# Patient Record
Sex: FEMALE | Race: WHITE | NOT HISPANIC OR LATINO | URBAN - METROPOLITAN AREA
[De-identification: names, ages, dates, MRNs, and addresses within clinical notes are randomized per-mention and may not be internally consistent; named-entity substitution may affect disease eponyms.]

---

## 2017-05-22 PROBLEM — E78.00 PURE HYPERCHOLESTEROLEMIA: Status: ACTIVE | Noted: 2017-05-22

## 2018-02-12 PROBLEM — Z80.3 FAMILY HISTORY OF BREAST CANCER: Status: ACTIVE | Noted: 2018-02-12

## 2018-05-01 PROBLEM — M54.16 LUMBAR RADICULOPATHY: Status: ACTIVE | Noted: 2018-05-01

## 2019-02-22 PROCEDURE — 87624 HPV HI-RISK TYP POOLED RSLT: CPT | Performed by: FAMILY MEDICINE

## 2019-02-22 PROCEDURE — 88175 CYTOPATH C/V AUTO FLUID REDO: CPT | Performed by: FAMILY MEDICINE

## 2019-07-16 PROCEDURE — 88305 TISSUE EXAM BY PATHOLOGIST: CPT | Performed by: SPECIALIST

## 2021-02-11 ENCOUNTER — IMMUNIZATION (OUTPATIENT)
Dept: LAB | Age: 61
End: 2021-02-11

## 2021-02-11 DIAGNOSIS — Z23 NEED FOR VACCINATION: Primary | ICD-10-CM

## 2021-02-11 PROCEDURE — 0001A COVID 19 PFIZER-BIONTECH: CPT

## 2021-02-11 PROCEDURE — 91300 COVID 19 PFIZER-BIONTECH: CPT

## 2021-03-05 ENCOUNTER — IMMUNIZATION (OUTPATIENT)
Dept: LAB | Age: 61
End: 2021-03-05
Attending: HOSPITALIST

## 2021-03-05 DIAGNOSIS — Z23 NEED FOR VACCINATION: Primary | ICD-10-CM

## 2021-03-05 PROCEDURE — 0002A COVID 19 PFIZER-BIONTECH: CPT

## 2021-03-05 PROCEDURE — 91300 COVID 19 PFIZER-BIONTECH: CPT

## 2022-08-16 RX ORDER — ABALOPARATIDE 2000 UG/ML
3120 INJECTION, SOLUTION SUBCUTANEOUS DAILY
COMMUNITY

## 2022-08-16 RX ORDER — CEPHALEXIN 500 MG/1
500 CAPSULE ORAL 2 TIMES DAILY
COMMUNITY
End: 2022-08-23

## 2022-08-16 RX ORDER — PANTOPRAZOLE SODIUM 40 MG/1
40 TABLET, DELAYED RELEASE ORAL
COMMUNITY

## 2022-08-17 ENCOUNTER — LAB ENCOUNTER (OUTPATIENT)
Dept: LAB | Age: 62
End: 2022-08-17
Attending: ORTHOPAEDIC SURGERY
Payer: COMMERCIAL

## 2022-08-17 DIAGNOSIS — Z01.818 PREOP TESTING: ICD-10-CM

## 2022-08-17 DIAGNOSIS — Z01.818 PRE-OP TESTING: ICD-10-CM

## 2022-08-17 DIAGNOSIS — Z72.0 TOBACCO USE: ICD-10-CM

## 2022-08-17 DIAGNOSIS — E78.00 PURE HYPERCHOLESTEROLEMIA: ICD-10-CM

## 2022-08-17 LAB
ALBUMIN SERPL-MCNC: 4 G/DL (ref 3.4–5)
ALBUMIN/GLOB SERPL: 1.1 {RATIO} (ref 1–2)
ALP LIVER SERPL-CCNC: 107 U/L
ALT SERPL-CCNC: 27 U/L
ANION GAP SERPL CALC-SCNC: 8 MMOL/L (ref 0–18)
ANTIBODY SCREEN: NEGATIVE
APTT PPP: 29.2 SECONDS (ref 23.3–35.6)
AST SERPL-CCNC: 20 U/L (ref 15–37)
BASOPHILS # BLD AUTO: 0.05 X10(3) UL (ref 0–0.2)
BASOPHILS NFR BLD AUTO: 0.8 %
BILIRUB SERPL-MCNC: 0.3 MG/DL (ref 0.1–2)
BUN BLD-MCNC: 8 MG/DL (ref 7–18)
BUN/CREAT SERPL: 11.1 (ref 10–20)
CALCIUM BLD-MCNC: 9.7 MG/DL (ref 8.5–10.1)
CHLORIDE SERPL-SCNC: 98 MMOL/L (ref 98–112)
CHOLEST SERPL-MCNC: 186 MG/DL (ref ?–200)
CO2 SERPL-SCNC: 25 MMOL/L (ref 21–32)
CREAT BLD-MCNC: 0.72 MG/DL
DEPRECATED RDW RBC AUTO: 45 FL (ref 35.1–46.3)
EOSINOPHIL # BLD AUTO: 0.14 X10(3) UL (ref 0–0.7)
EOSINOPHIL NFR BLD AUTO: 2.1 %
ERYTHROCYTE [DISTWIDTH] IN BLOOD BY AUTOMATED COUNT: 13 % (ref 11–15)
FASTING PATIENT LIPID ANSWER: NO
FASTING STATUS PATIENT QL REPORTED: NO
GFR SERPLBLD BASED ON 1.73 SQ M-ARVRAT: 96 ML/MIN/1.73M2 (ref 60–?)
GLOBULIN PLAS-MCNC: 3.6 G/DL (ref 2.8–4.4)
GLUCOSE BLD-MCNC: 95 MG/DL (ref 70–99)
HCT VFR BLD AUTO: 32.6 %
HDLC SERPL-MCNC: 97 MG/DL (ref 40–59)
HGB BLD-MCNC: 11.1 G/DL
IMM GRANULOCYTES # BLD AUTO: 0.02 X10(3) UL (ref 0–1)
IMM GRANULOCYTES NFR BLD: 0.3 %
INR BLD: 1.03 (ref 0.85–1.16)
LDLC SERPL CALC-MCNC: 80 MG/DL (ref ?–100)
LYMPHOCYTES # BLD AUTO: 1.27 X10(3) UL (ref 1–4)
LYMPHOCYTES NFR BLD AUTO: 19.3 %
MCH RBC QN AUTO: 32 PG (ref 26–34)
MCHC RBC AUTO-ENTMCNC: 34 G/DL (ref 31–37)
MCV RBC AUTO: 93.9 FL
MONOCYTES # BLD AUTO: 0.53 X10(3) UL (ref 0.1–1)
MONOCYTES NFR BLD AUTO: 8.1 %
MRSA DNA SPEC QL NAA+PROBE: NEGATIVE
NEUTROPHILS # BLD AUTO: 4.56 X10 (3) UL (ref 1.5–7.7)
NEUTROPHILS # BLD AUTO: 4.56 X10(3) UL (ref 1.5–7.7)
NEUTROPHILS NFR BLD AUTO: 69.4 %
NONHDLC SERPL-MCNC: 89 MG/DL (ref ?–130)
OSMOLALITY SERPL CALC.SUM OF ELEC: 270 MOSM/KG (ref 275–295)
PLATELET # BLD AUTO: 347 10(3)UL (ref 150–450)
POTASSIUM SERPL-SCNC: 4.3 MMOL/L (ref 3.5–5.1)
PROT SERPL-MCNC: 7.6 G/DL (ref 6.4–8.2)
PROTHROMBIN TIME: 13.4 SECONDS (ref 11.6–14.8)
RBC # BLD AUTO: 3.47 X10(6)UL
RH BLOOD TYPE: POSITIVE
RH BLOOD TYPE: POSITIVE
SARS-COV-2 RNA RESP QL NAA+PROBE: NOT DETECTED
SODIUM SERPL-SCNC: 131 MMOL/L (ref 136–145)
TRIGL SERPL-MCNC: 45 MG/DL (ref 30–149)
TSI SER-ACNC: 1.33 MIU/ML (ref 0.36–3.74)
VLDLC SERPL CALC-MCNC: 7 MG/DL (ref 0–30)
WBC # BLD AUTO: 6.6 X10(3) UL (ref 4–11)

## 2022-08-17 PROCEDURE — 80053 COMPREHEN METABOLIC PANEL: CPT

## 2022-08-17 PROCEDURE — 85610 PROTHROMBIN TIME: CPT

## 2022-08-17 PROCEDURE — 86920 COMPATIBILITY TEST SPIN: CPT

## 2022-08-17 PROCEDURE — 86850 RBC ANTIBODY SCREEN: CPT

## 2022-08-17 PROCEDURE — 84443 ASSAY THYROID STIM HORMONE: CPT

## 2022-08-17 PROCEDURE — 85730 THROMBOPLASTIN TIME PARTIAL: CPT

## 2022-08-17 PROCEDURE — 86901 BLOOD TYPING SEROLOGIC RH(D): CPT

## 2022-08-17 PROCEDURE — 80061 LIPID PANEL: CPT

## 2022-08-17 PROCEDURE — 86900 BLOOD TYPING SEROLOGIC ABO: CPT

## 2022-08-17 PROCEDURE — 36415 COLL VENOUS BLD VENIPUNCTURE: CPT

## 2022-08-17 PROCEDURE — 87641 MR-STAPH DNA AMP PROBE: CPT

## 2022-08-17 PROCEDURE — 85025 COMPLETE CBC W/AUTO DIFF WBC: CPT

## 2022-08-18 NOTE — PAT NURSING NOTE
Spoke with RN staff at Dr. Max Juan office regarding patient low sodium of 131. Patent with known Hyponatremia per internal medicine MD note. Message will be sent to Dr. Maira Campbell. [id number] Mullins from MD office called and requested CBC and CMP be faxed to office for MD review. Faxed to [phone].

## 2022-08-19 ENCOUNTER — APPOINTMENT (OUTPATIENT)
Dept: GENERAL RADIOLOGY | Facility: HOSPITAL | Age: 62
End: 2022-08-19
Attending: ORTHOPAEDIC SURGERY
Payer: COMMERCIAL

## 2022-08-19 ENCOUNTER — ANESTHESIA EVENT (OUTPATIENT)
Dept: SURGERY | Facility: HOSPITAL | Age: 62
End: 2022-08-19
Payer: COMMERCIAL

## 2022-08-19 ENCOUNTER — ANESTHESIA (OUTPATIENT)
Dept: SURGERY | Facility: HOSPITAL | Age: 62
End: 2022-08-19
Payer: COMMERCIAL

## 2022-08-19 ENCOUNTER — HOSPITAL ENCOUNTER (INPATIENT)
Facility: HOSPITAL | Age: 62
LOS: 4 days | Discharge: HOME HEALTH | End: 2022-08-23
Attending: ORTHOPAEDIC SURGERY | Admitting: ORTHOPAEDIC SURGERY
Payer: COMMERCIAL

## 2022-08-19 DIAGNOSIS — M41.25 OTHER IDIOPATHIC SCOLIOSIS, THORACOLUMBAR REGION: ICD-10-CM

## 2022-08-19 DIAGNOSIS — M54.16 LUMBAR RADICULOPATHY: ICD-10-CM

## 2022-08-19 DIAGNOSIS — Z01.818 PREOP TESTING: Primary | ICD-10-CM

## 2022-08-19 LAB
ANION GAP SERPL CALC-SCNC: 9 MMOL/L (ref 0–18)
BASE EXCESS BLD CALC-SCNC: -6.3 MMOL/L (ref ?–2)
BASE EXCESS BLD CALC-SCNC: -8.1 MMOL/L (ref ?–2)
BUN BLD-MCNC: 14 MG/DL (ref 7–18)
BUN/CREAT SERPL: 17.7 (ref 10–20)
CA-I BLD-SCNC: 1.15 MMOL/L (ref 0.95–1.32)
CA-I BLD-SCNC: 1.16 MMOL/L (ref 0.95–1.32)
CALCIUM BLD-MCNC: 9.7 MG/DL (ref 8.5–10.1)
CHLORIDE SERPL-SCNC: 104 MMOL/L (ref 98–112)
CO2 SERPL-SCNC: 22 MMOL/L (ref 21–32)
COHGB MFR BLD: 2.1 % (ref 0–3)
COHGB MFR BLD: 2.4 % (ref 0–3)
CREAT BLD-MCNC: 0.79 MG/DL
GFR SERPLBLD BASED ON 1.73 SQ M-ARVRAT: 86 ML/MIN/1.73M2 (ref 60–?)
GLUCOSE BLD-MCNC: 102 MG/DL (ref 70–99)
HCO3 BLDA-SCNC: 18.6 MEQ/L (ref 21–27)
HCO3 BLDA-SCNC: 20 MEQ/L (ref 21–27)
HGB BLD-MCNC: 7.6 G/DL
HGB BLD-MCNC: 8.4 G/DL
LACTATE BLD-SCNC: 0.6 MMOL/L (ref 0.5–2)
LACTATE BLD-SCNC: 0.7 MMOL/L (ref 0.5–2)
METHGB MFR BLD: 1.1 % SAT (ref 0.4–1.5)
METHGB MFR BLD: 1.2 % SAT (ref 0.4–1.5)
O2 CT BLD-SCNC: 11 VOL% (ref 15–23)
O2 CT BLD-SCNC: 12.1 VOL% (ref 15–23)
OSMOLALITY SERPL CALC.SUM OF ELEC: 281 MOSM/KG (ref 275–295)
PCO2 BLDA: 35 MM HG (ref 35–45)
PCO2 BLDA: 37 MM HG (ref 35–45)
PH BLDA: 7.29 [PH] (ref 7.35–7.45)
PH BLDA: 7.34 [PH] (ref 7.35–7.45)
PO2 BLDA: 244 MM HG (ref 80–100)
PO2 BLDA: 253 MM HG (ref 80–100)
POTASSIUM BLD-SCNC: 3.6 MMOL/L (ref 3.6–5.1)
POTASSIUM BLD-SCNC: 3.8 MMOL/L (ref 3.6–5.1)
POTASSIUM SERPL-SCNC: 4 MMOL/L (ref 3.5–5.1)
PUNCTURE CHARGE: NO
PUNCTURE CHARGE: NO
SAO2 % BLDA: >99 % (ref 94–100)
SAO2 % BLDA: >99 % (ref 94–100)
SODIUM BLD-SCNC: 132 MMOL/L (ref 135–145)
SODIUM BLD-SCNC: 133 MMOL/L (ref 135–145)
SODIUM SERPL-SCNC: 135 MMOL/L (ref 136–145)

## 2022-08-19 PROCEDURE — 84295 ASSAY OF SERUM SODIUM: CPT | Performed by: ANESTHESIOLOGY

## 2022-08-19 PROCEDURE — 95939 C MOTOR EVOKED UPR&LWR LIMBS: CPT | Performed by: ORTHOPAEDIC SURGERY

## 2022-08-19 PROCEDURE — 83605 ASSAY OF LACTIC ACID: CPT | Performed by: ANESTHESIOLOGY

## 2022-08-19 PROCEDURE — 82330 ASSAY OF CALCIUM: CPT | Performed by: ANESTHESIOLOGY

## 2022-08-19 PROCEDURE — 36430 TRANSFUSION BLD/BLD COMPNT: CPT | Performed by: ANESTHESIOLOGY

## 2022-08-19 PROCEDURE — 0SG30AJ FUSION OF LUMBOSACRAL JOINT WITH INTERBODY FUSION DEVICE, POSTERIOR APPROACH, ANTERIOR COLUMN, OPEN APPROACH: ICD-10-PCS | Performed by: ORTHOPAEDIC SURGERY

## 2022-08-19 PROCEDURE — 0ST20ZZ RESECTION OF LUMBAR VERTEBRAL DISC, OPEN APPROACH: ICD-10-PCS | Performed by: ORTHOPAEDIC SURGERY

## 2022-08-19 PROCEDURE — 0SG704Z FUSION OF RIGHT SACROILIAC JOINT WITH INTERNAL FIXATION DEVICE, OPEN APPROACH: ICD-10-PCS | Performed by: ORTHOPAEDIC SURGERY

## 2022-08-19 PROCEDURE — P9045 ALBUMIN (HUMAN), 5%, 250 ML: HCPCS | Performed by: ANESTHESIOLOGY

## 2022-08-19 PROCEDURE — 3E0U0GB INTRODUCTION OF RECOMBINANT BONE MORPHOGENETIC PROTEIN INTO JOINTS, OPEN APPROACH: ICD-10-PCS | Performed by: ORTHOPAEDIC SURGERY

## 2022-08-19 PROCEDURE — 80048 BASIC METABOLIC PNL TOTAL CA: CPT | Performed by: ORTHOPAEDIC SURGERY

## 2022-08-19 PROCEDURE — 84132 ASSAY OF SERUM POTASSIUM: CPT | Performed by: ANESTHESIOLOGY

## 2022-08-19 PROCEDURE — 01NB0ZZ RELEASE LUMBAR NERVE, OPEN APPROACH: ICD-10-PCS | Performed by: ORTHOPAEDIC SURGERY

## 2022-08-19 PROCEDURE — 82805 BLOOD GASES W/O2 SATURATION: CPT | Performed by: ANESTHESIOLOGY

## 2022-08-19 PROCEDURE — 0SG00AJ FUSION OF LUMBAR VERTEBRAL JOINT WITH INTERBODY FUSION DEVICE, POSTERIOR APPROACH, ANTERIOR COLUMN, OPEN APPROACH: ICD-10-PCS | Performed by: ORTHOPAEDIC SURGERY

## 2022-08-19 PROCEDURE — P9045 ALBUMIN (HUMAN), 5%, 250 ML: HCPCS

## 2022-08-19 PROCEDURE — 4A11X4G MONITORING OF PERIPHERAL NERVOUS ELECTRICAL ACTIVITY, INTRAOPERATIVE, EXTERNAL APPROACH: ICD-10-PCS | Performed by: ORTHOPAEDIC SURGERY

## 2022-08-19 PROCEDURE — 83050 HGB METHEMOGLOBIN QUAN: CPT | Performed by: ANESTHESIOLOGY

## 2022-08-19 PROCEDURE — 0SG1071 FUSION OF 2 OR MORE LUMBAR VERTEBRAL JOINTS WITH AUTOLOGOUS TISSUE SUBSTITUTE, POSTERIOR APPROACH, POSTERIOR COLUMN, OPEN APPROACH: ICD-10-PCS | Performed by: ORTHOPAEDIC SURGERY

## 2022-08-19 PROCEDURE — 82375 ASSAY CARBOXYHB QUANT: CPT | Performed by: ANESTHESIOLOGY

## 2022-08-19 PROCEDURE — 95860 NEEDLE EMG 1 EXTREMITY: CPT | Performed by: ORTHOPAEDIC SURGERY

## 2022-08-19 PROCEDURE — 76000 FLUOROSCOPY <1 HR PHYS/QHP: CPT | Performed by: ORTHOPAEDIC SURGERY

## 2022-08-19 PROCEDURE — 0ST40ZZ RESECTION OF LUMBOSACRAL DISC, OPEN APPROACH: ICD-10-PCS | Performed by: ORTHOPAEDIC SURGERY

## 2022-08-19 PROCEDURE — 0SG804Z FUSION OF LEFT SACROILIAC JOINT WITH INTERNAL FIXATION DEVICE, OPEN APPROACH: ICD-10-PCS | Performed by: ORTHOPAEDIC SURGERY

## 2022-08-19 PROCEDURE — 30233N1 TRANSFUSION OF NONAUTOLOGOUS RED BLOOD CELLS INTO PERIPHERAL VEIN, PERCUTANEOUS APPROACH: ICD-10-PCS | Performed by: ORTHOPAEDIC SURGERY

## 2022-08-19 PROCEDURE — 95938 SOMATOSENSORY TESTING: CPT | Performed by: ORTHOPAEDIC SURGERY

## 2022-08-19 PROCEDURE — 85018 HEMOGLOBIN: CPT | Performed by: ANESTHESIOLOGY

## 2022-08-19 PROCEDURE — 0SG00A0 FUSION OF LUMBAR VERTEBRAL JOINT WITH INTERBODY FUSION DEVICE, ANTERIOR APPROACH, ANTERIOR COLUMN, OPEN APPROACH: ICD-10-PCS | Performed by: ORTHOPAEDIC SURGERY

## 2022-08-19 PROCEDURE — 0SG3071 FUSION OF LUMBOSACRAL JOINT WITH AUTOLOGOUS TISSUE SUBSTITUTE, POSTERIOR APPROACH, POSTERIOR COLUMN, OPEN APPROACH: ICD-10-PCS | Performed by: ORTHOPAEDIC SURGERY

## 2022-08-19 PROCEDURE — 0RG7071 FUSION OF 2 TO 7 THORACIC VERTEBRAL JOINTS WITH AUTOLOGOUS TISSUE SUBSTITUTE, POSTERIOR APPROACH, POSTERIOR COLUMN, OPEN APPROACH: ICD-10-PCS | Performed by: ORTHOPAEDIC SURGERY

## 2022-08-19 DEVICE — BONE GRAFT KIT 7510600 INFUSE LARGE
Type: IMPLANTABLE DEVICE | Site: BACK | Status: FUNCTIONAL
Brand: INFUSE® BONE GRAFT

## 2022-08-19 DEVICE — OPEN-OPEN REVISION CONNECTOR, 11 MM
Type: IMPLANTABLE DEVICE | Site: BACK | Status: FUNCTIONAL
Brand: INVICTUS

## 2022-08-19 DEVICE — DURASEAL® DURAL SEALANT SYSTEM 5ML 5 PACK
Type: IMPLANTABLE DEVICE | Site: BACK | Status: FUNCTIONAL
Brand: DURASEAL®

## 2022-08-19 DEVICE — BONE ALLO CANCELOUS CHIP 30CC: Type: IMPLANTABLE DEVICE | Site: BACK | Status: FUNCTIONAL

## 2022-08-19 DEVICE — GRAFT BONE PROPEL MEDIUM FIBER: Type: IMPLANTABLE DEVICE | Site: BACK | Status: FUNCTIONAL

## 2022-08-19 DEVICE — SET SCREW
Type: IMPLANTABLE DEVICE | Site: BACK | Status: FUNCTIONAL
Brand: INVICTUS

## 2022-08-19 RX ORDER — HYDROMORPHONE HYDROCHLORIDE 1 MG/ML
0.4 INJECTION, SOLUTION INTRAMUSCULAR; INTRAVENOUS; SUBCUTANEOUS EVERY 2 HOUR PRN
Status: DISCONTINUED | OUTPATIENT
Start: 2022-08-19 | End: 2022-08-23

## 2022-08-19 RX ORDER — SODIUM CHLORIDE, SODIUM LACTATE, POTASSIUM CHLORIDE, CALCIUM CHLORIDE 600; 310; 30; 20 MG/100ML; MG/100ML; MG/100ML; MG/100ML
INJECTION, SOLUTION INTRAVENOUS CONTINUOUS
Status: DISCONTINUED | OUTPATIENT
Start: 2022-08-19 | End: 2022-08-23

## 2022-08-19 RX ORDER — CEFAZOLIN SODIUM/WATER 2 G/20 ML
2 SYRINGE (ML) INTRAVENOUS ONCE
Status: COMPLETED | OUTPATIENT
Start: 2022-08-19 | End: 2022-08-19

## 2022-08-19 RX ORDER — HYDROMORPHONE HYDROCHLORIDE 1 MG/ML
0.2 INJECTION, SOLUTION INTRAMUSCULAR; INTRAVENOUS; SUBCUTANEOUS EVERY 5 MIN PRN
Status: DISCONTINUED | OUTPATIENT
Start: 2022-08-19 | End: 2022-08-19 | Stop reason: HOSPADM

## 2022-08-19 RX ORDER — MORPHINE SULFATE 4 MG/ML
2 INJECTION, SOLUTION INTRAMUSCULAR; INTRAVENOUS EVERY 10 MIN PRN
Status: DISCONTINUED | OUTPATIENT
Start: 2022-08-19 | End: 2022-08-19 | Stop reason: HOSPADM

## 2022-08-19 RX ORDER — OXYCODONE HYDROCHLORIDE 5 MG/1
5 TABLET ORAL EVERY 4 HOURS PRN
Status: DISCONTINUED | OUTPATIENT
Start: 2022-08-19 | End: 2022-08-23

## 2022-08-19 RX ORDER — DIPHENHYDRAMINE HCL 25 MG
25 CAPSULE ORAL EVERY 4 HOURS PRN
Status: DISCONTINUED | OUTPATIENT
Start: 2022-08-19 | End: 2022-08-23

## 2022-08-19 RX ORDER — PROCHLORPERAZINE EDISYLATE 5 MG/ML
5 INJECTION INTRAMUSCULAR; INTRAVENOUS EVERY 8 HOURS PRN
Status: DISCONTINUED | OUTPATIENT
Start: 2022-08-19 | End: 2022-08-23

## 2022-08-19 RX ORDER — MAGNESIUM HYDROXIDE 1200 MG/15ML
LIQUID ORAL CONTINUOUS PRN
Status: COMPLETED | OUTPATIENT
Start: 2022-08-19 | End: 2022-08-19

## 2022-08-19 RX ORDER — GLYCOPYRROLATE 0.2 MG/ML
INJECTION, SOLUTION INTRAMUSCULAR; INTRAVENOUS AS NEEDED
Status: DISCONTINUED | OUTPATIENT
Start: 2022-08-19 | End: 2022-08-19 | Stop reason: SURG

## 2022-08-19 RX ORDER — DEXAMETHASONE SODIUM PHOSPHATE 4 MG/ML
VIAL (ML) INJECTION AS NEEDED
Status: DISCONTINUED | OUTPATIENT
Start: 2022-08-19 | End: 2022-08-19 | Stop reason: SURG

## 2022-08-19 RX ORDER — MORPHINE SULFATE 4 MG/ML
4 INJECTION, SOLUTION INTRAMUSCULAR; INTRAVENOUS EVERY 10 MIN PRN
Status: DISCONTINUED | OUTPATIENT
Start: 2022-08-19 | End: 2022-08-19 | Stop reason: HOSPADM

## 2022-08-19 RX ORDER — SODIUM CHLORIDE 9 MG/ML
INJECTION, SOLUTION INTRAVENOUS CONTINUOUS
Status: DISCONTINUED | OUTPATIENT
Start: 2022-08-19 | End: 2022-08-23

## 2022-08-19 RX ORDER — CEFAZOLIN SODIUM/WATER 2 G/20 ML
2 SYRINGE (ML) INTRAVENOUS EVERY 8 HOURS
Status: COMPLETED | OUTPATIENT
Start: 2022-08-19 | End: 2022-08-20

## 2022-08-19 RX ORDER — HYDROMORPHONE HYDROCHLORIDE 1 MG/ML
0.4 INJECTION, SOLUTION INTRAMUSCULAR; INTRAVENOUS; SUBCUTANEOUS EVERY 5 MIN PRN
Status: DISCONTINUED | OUTPATIENT
Start: 2022-08-19 | End: 2022-08-19 | Stop reason: HOSPADM

## 2022-08-19 RX ORDER — MORPHINE SULFATE 10 MG/ML
6 INJECTION, SOLUTION INTRAMUSCULAR; INTRAVENOUS EVERY 10 MIN PRN
Status: DISCONTINUED | OUTPATIENT
Start: 2022-08-19 | End: 2022-08-19 | Stop reason: HOSPADM

## 2022-08-19 RX ORDER — PREGABALIN 50 MG/1
50 CAPSULE ORAL 2 TIMES DAILY
Status: DISCONTINUED | OUTPATIENT
Start: 2022-08-19 | End: 2022-08-23

## 2022-08-19 RX ORDER — NALOXONE HYDROCHLORIDE 0.4 MG/ML
80 INJECTION, SOLUTION INTRAMUSCULAR; INTRAVENOUS; SUBCUTANEOUS AS NEEDED
Status: DISCONTINUED | OUTPATIENT
Start: 2022-08-19 | End: 2022-08-19 | Stop reason: HOSPADM

## 2022-08-19 RX ORDER — CYCLOBENZAPRINE HCL 5 MG
5 TABLET ORAL EVERY 6 HOURS PRN
Status: DISCONTINUED | OUTPATIENT
Start: 2022-08-19 | End: 2022-08-23

## 2022-08-19 RX ORDER — ACETAMINOPHEN 500 MG
1000 TABLET ORAL ONCE
Status: COMPLETED | OUTPATIENT
Start: 2022-08-19 | End: 2022-08-19

## 2022-08-19 RX ORDER — EPHEDRINE SULFATE 50 MG/ML
INJECTION, SOLUTION INTRAVENOUS AS NEEDED
Status: DISCONTINUED | OUTPATIENT
Start: 2022-08-19 | End: 2022-08-19 | Stop reason: SURG

## 2022-08-19 RX ORDER — ONDANSETRON 2 MG/ML
4 INJECTION INTRAMUSCULAR; INTRAVENOUS EVERY 6 HOURS PRN
Status: DISCONTINUED | OUTPATIENT
Start: 2022-08-19 | End: 2022-08-23

## 2022-08-19 RX ORDER — TRANEXAMIC ACID 100 MG/ML
INJECTION, SOLUTION INTRAVENOUS AS NEEDED
Status: DISCONTINUED | OUTPATIENT
Start: 2022-08-19 | End: 2022-08-19 | Stop reason: SURG

## 2022-08-19 RX ORDER — DOCUSATE SODIUM 100 MG/1
100 CAPSULE, LIQUID FILLED ORAL 2 TIMES DAILY
Status: DISCONTINUED | OUTPATIENT
Start: 2022-08-19 | End: 2022-08-23

## 2022-08-19 RX ORDER — NEOSTIGMINE METHYLSULFATE 1 MG/ML
INJECTION, SOLUTION INTRAVENOUS AS NEEDED
Status: DISCONTINUED | OUTPATIENT
Start: 2022-08-19 | End: 2022-08-19 | Stop reason: SURG

## 2022-08-19 RX ORDER — VANCOMYCIN HYDROCHLORIDE 1 G/20ML
INJECTION, POWDER, LYOPHILIZED, FOR SOLUTION INTRAVENOUS AS NEEDED
Status: DISCONTINUED | OUTPATIENT
Start: 2022-08-19 | End: 2022-08-19 | Stop reason: HOSPADM

## 2022-08-19 RX ORDER — ONDANSETRON 2 MG/ML
4 INJECTION INTRAMUSCULAR; INTRAVENOUS EVERY 6 HOURS PRN
Status: DISCONTINUED | OUTPATIENT
Start: 2022-08-19 | End: 2022-08-19 | Stop reason: HOSPADM

## 2022-08-19 RX ORDER — DIPHENHYDRAMINE HYDROCHLORIDE 50 MG/ML
25 INJECTION INTRAMUSCULAR; INTRAVENOUS EVERY 4 HOURS PRN
Status: DISCONTINUED | OUTPATIENT
Start: 2022-08-19 | End: 2022-08-23

## 2022-08-19 RX ORDER — BISACODYL 10 MG
10 SUPPOSITORY, RECTAL RECTAL
Status: DISCONTINUED | OUTPATIENT
Start: 2022-08-19 | End: 2022-08-23

## 2022-08-19 RX ORDER — SODIUM CHLORIDE, SODIUM LACTATE, POTASSIUM CHLORIDE, CALCIUM CHLORIDE 600; 310; 30; 20 MG/100ML; MG/100ML; MG/100ML; MG/100ML
INJECTION, SOLUTION INTRAVENOUS CONTINUOUS
Status: DISCONTINUED | OUTPATIENT
Start: 2022-08-19 | End: 2022-08-19 | Stop reason: HOSPADM

## 2022-08-19 RX ORDER — OXYCODONE HYDROCHLORIDE 5 MG/1
10 TABLET ORAL EVERY 4 HOURS PRN
Status: DISCONTINUED | OUTPATIENT
Start: 2022-08-19 | End: 2022-08-23

## 2022-08-19 RX ORDER — SENNOSIDES 8.6 MG
17.2 TABLET ORAL NIGHTLY
Status: DISCONTINUED | OUTPATIENT
Start: 2022-08-19 | End: 2022-08-23

## 2022-08-19 RX ORDER — SODIUM CHLORIDE 9 MG/ML
INJECTION, SOLUTION INTRAVENOUS CONTINUOUS PRN
Status: DISCONTINUED | OUTPATIENT
Start: 2022-08-19 | End: 2022-08-19 | Stop reason: SURG

## 2022-08-19 RX ORDER — ROCURONIUM BROMIDE 10 MG/ML
INJECTION, SOLUTION INTRAVENOUS AS NEEDED
Status: DISCONTINUED | OUTPATIENT
Start: 2022-08-19 | End: 2022-08-19 | Stop reason: SURG

## 2022-08-19 RX ORDER — HYDROMORPHONE HYDROCHLORIDE 1 MG/ML
0.6 INJECTION, SOLUTION INTRAMUSCULAR; INTRAVENOUS; SUBCUTANEOUS EVERY 5 MIN PRN
Status: DISCONTINUED | OUTPATIENT
Start: 2022-08-19 | End: 2022-08-19 | Stop reason: HOSPADM

## 2022-08-19 RX ORDER — ONDANSETRON 2 MG/ML
INJECTION INTRAMUSCULAR; INTRAVENOUS AS NEEDED
Status: DISCONTINUED | OUTPATIENT
Start: 2022-08-19 | End: 2022-08-19 | Stop reason: SURG

## 2022-08-19 RX ORDER — POLYETHYLENE GLYCOL 3350 17 G/17G
17 POWDER, FOR SOLUTION ORAL DAILY PRN
Status: DISCONTINUED | OUTPATIENT
Start: 2022-08-19 | End: 2022-08-23

## 2022-08-19 RX ORDER — SODIUM PHOSPHATE, DIBASIC AND SODIUM PHOSPHATE, MONOBASIC 7; 19 G/133ML; G/133ML
1 ENEMA RECTAL ONCE AS NEEDED
Status: DISCONTINUED | OUTPATIENT
Start: 2022-08-19 | End: 2022-08-23

## 2022-08-19 RX ORDER — ALBUMIN, HUMAN INJ 5% 5 %
SOLUTION INTRAVENOUS CONTINUOUS PRN
Status: DISCONTINUED | OUTPATIENT
Start: 2022-08-19 | End: 2022-08-19 | Stop reason: SURG

## 2022-08-19 RX ORDER — HYDROMORPHONE HYDROCHLORIDE 1 MG/ML
0.8 INJECTION, SOLUTION INTRAMUSCULAR; INTRAVENOUS; SUBCUTANEOUS EVERY 2 HOUR PRN
Status: DISCONTINUED | OUTPATIENT
Start: 2022-08-19 | End: 2022-08-23

## 2022-08-19 RX ORDER — PROCHLORPERAZINE EDISYLATE 5 MG/ML
5 INJECTION INTRAMUSCULAR; INTRAVENOUS EVERY 8 HOURS PRN
Status: DISCONTINUED | OUTPATIENT
Start: 2022-08-19 | End: 2022-08-19 | Stop reason: HOSPADM

## 2022-08-19 RX ADMIN — ROCURONIUM BROMIDE 10 MG: 10 INJECTION, SOLUTION INTRAVENOUS at 08:34:00

## 2022-08-19 RX ADMIN — SODIUM CHLORIDE: 9 INJECTION, SOLUTION INTRAVENOUS at 09:28:00

## 2022-08-19 RX ADMIN — NEOSTIGMINE METHYLSULFATE 4 MG: 1 INJECTION, SOLUTION INTRAVENOUS at 14:34:00

## 2022-08-19 RX ADMIN — EPHEDRINE SULFATE 5 MG: 50 INJECTION, SOLUTION INTRAVENOUS at 07:55:00

## 2022-08-19 RX ADMIN — ROCURONIUM BROMIDE 25 MG: 10 INJECTION, SOLUTION INTRAVENOUS at 08:20:00

## 2022-08-19 RX ADMIN — EPHEDRINE SULFATE 5 MG: 50 INJECTION, SOLUTION INTRAVENOUS at 10:47:00

## 2022-08-19 RX ADMIN — SODIUM CHLORIDE: 9 INJECTION, SOLUTION INTRAVENOUS at 08:40:00

## 2022-08-19 RX ADMIN — SODIUM CHLORIDE, SODIUM LACTATE, POTASSIUM CHLORIDE, CALCIUM CHLORIDE: 600; 310; 30; 20 INJECTION, SOLUTION INTRAVENOUS at 11:07:00

## 2022-08-19 RX ADMIN — SODIUM CHLORIDE, SODIUM LACTATE, POTASSIUM CHLORIDE, CALCIUM CHLORIDE: 600; 310; 30; 20 INJECTION, SOLUTION INTRAVENOUS at 10:08:00

## 2022-08-19 RX ADMIN — EPHEDRINE SULFATE 5 MG: 50 INJECTION, SOLUTION INTRAVENOUS at 11:13:00

## 2022-08-19 RX ADMIN — SODIUM CHLORIDE, SODIUM LACTATE, POTASSIUM CHLORIDE, CALCIUM CHLORIDE: 600; 310; 30; 20 INJECTION, SOLUTION INTRAVENOUS at 08:37:00

## 2022-08-19 RX ADMIN — EPHEDRINE SULFATE 5 MG: 50 INJECTION, SOLUTION INTRAVENOUS at 13:09:00

## 2022-08-19 RX ADMIN — Medication 50 ML: at 12:59:00

## 2022-08-19 RX ADMIN — SODIUM CHLORIDE: 9 INJECTION, SOLUTION INTRAVENOUS at 12:20:00

## 2022-08-19 RX ADMIN — SODIUM CHLORIDE, SODIUM LACTATE, POTASSIUM CHLORIDE, CALCIUM CHLORIDE: 600; 310; 30; 20 INJECTION, SOLUTION INTRAVENOUS at 07:28:00

## 2022-08-19 RX ADMIN — EPHEDRINE SULFATE 5 MG: 50 INJECTION, SOLUTION INTRAVENOUS at 12:39:00

## 2022-08-19 RX ADMIN — CEFAZOLIN SODIUM/WATER 2 G: 2 G/20 ML SYRINGE (ML) INTRAVENOUS at 11:48:00

## 2022-08-19 RX ADMIN — SODIUM CHLORIDE: 9 INJECTION, SOLUTION INTRAVENOUS at 11:13:00

## 2022-08-19 RX ADMIN — SODIUM CHLORIDE, SODIUM LACTATE, POTASSIUM CHLORIDE, CALCIUM CHLORIDE: 600; 310; 30; 20 INJECTION, SOLUTION INTRAVENOUS at 13:55:00

## 2022-08-19 RX ADMIN — CEFAZOLIN SODIUM/WATER 2 G: 2 G/20 ML SYRINGE (ML) INTRAVENOUS at 07:48:00

## 2022-08-19 RX ADMIN — EPHEDRINE SULFATE 5 MG: 50 INJECTION, SOLUTION INTRAVENOUS at 13:02:00

## 2022-08-19 RX ADMIN — ROCURONIUM BROMIDE 5 MG: 10 INJECTION, SOLUTION INTRAVENOUS at 07:37:00

## 2022-08-19 RX ADMIN — EPHEDRINE SULFATE 5 MG: 50 INJECTION, SOLUTION INTRAVENOUS at 10:19:00

## 2022-08-19 RX ADMIN — SODIUM CHLORIDE: 9 INJECTION, SOLUTION INTRAVENOUS at 07:50:00

## 2022-08-19 RX ADMIN — ALBUMIN, HUMAN INJ 5%: 5 SOLUTION INTRAVENOUS at 10:46:00

## 2022-08-19 RX ADMIN — SODIUM CHLORIDE: 9 INJECTION, SOLUTION INTRAVENOUS at 10:10:00

## 2022-08-19 RX ADMIN — SODIUM CHLORIDE: 9 INJECTION, SOLUTION INTRAVENOUS at 11:14:00

## 2022-08-19 RX ADMIN — SODIUM CHLORIDE: 9 INJECTION, SOLUTION INTRAVENOUS at 09:27:00

## 2022-08-19 RX ADMIN — SODIUM CHLORIDE: 9 INJECTION, SOLUTION INTRAVENOUS at 13:55:00

## 2022-08-19 RX ADMIN — ONDANSETRON 4 MG: 2 INJECTION INTRAMUSCULAR; INTRAVENOUS at 14:03:00

## 2022-08-19 RX ADMIN — TRANEXAMIC ACID 3185 MG: 100 INJECTION, SOLUTION INTRAVENOUS at 07:45:00

## 2022-08-19 RX ADMIN — SODIUM CHLORIDE: 9 INJECTION, SOLUTION INTRAVENOUS at 14:49:00

## 2022-08-19 RX ADMIN — EPHEDRINE SULFATE 5 MG: 50 INJECTION, SOLUTION INTRAVENOUS at 09:15:00

## 2022-08-19 RX ADMIN — ALBUMIN, HUMAN INJ 5%: 5 SOLUTION INTRAVENOUS at 09:50:00

## 2022-08-19 RX ADMIN — SODIUM CHLORIDE: 9 INJECTION, SOLUTION INTRAVENOUS at 11:46:00

## 2022-08-19 RX ADMIN — SODIUM CHLORIDE: 9 INJECTION, SOLUTION INTRAVENOUS at 13:04:00

## 2022-08-19 RX ADMIN — DEXAMETHASONE SODIUM PHOSPHATE 8 MG: 4 MG/ML VIAL (ML) INJECTION at 08:42:00

## 2022-08-19 RX ADMIN — EPHEDRINE SULFATE 5 MG: 50 INJECTION, SOLUTION INTRAVENOUS at 07:50:00

## 2022-08-19 RX ADMIN — GLYCOPYRROLATE 0.8 MG: 0.2 INJECTION, SOLUTION INTRAMUSCULAR; INTRAVENOUS at 14:34:00

## 2022-08-19 NOTE — BRIEF OP NOTE
Pre-Operative Diagnosis: Other idiopathic scoliosis, thoracolumbar region [M41.25]  Lumbar radiculopathy [M54.16]     Post-Operative Diagnosis: Other idiopathic scoliosis, thoracolumbar region [M41.25]Lumbar radiculopathy [M54.16]      Procedure Performed:   PSF and instrumentation  T10-Pelvis, Right lateral interbody fusion L1-2, Right TLIF L4-5 and Left TLIF L5-S1, finney radha osteotomy L4-5 and L5-S1, facetectomy's T10-T4    Surgeon(s) and Role:     * Kaye Mills MD - Primary     * Rio Coleman MD - Assisting Surgeon    Assistant(s):  Surgical Assistant.: Janelle Cuellar     Surgical Findings: Degenerative scoliosis with foraminal stenosis      Specimen: NA    Deep drain placed      Estimated Blood Loss: Blood Output: 900 mL (8/19/2022  2:08 PM)    Received 2 units PRBC transfusion       Dictation Number:      Cornelio Duke MD  8/19/2022  2:45 PM

## 2022-08-19 NOTE — ANESTHESIA PROCEDURE NOTES
Airway  Date/Time: 8/19/2022 7:41 AM  Urgency: Elective      General Information and Staff    Patient location during procedure: OR  Anesthesiologist: Keshawn Soriano DO  Performed: anesthesiologist     Indications and Patient Condition  Indications for airway management: anesthesia  Sedation level: deep  Preoxygenated: yes  Patient position: sniffing  Mask difficulty assessment: 1 - vent by mask    Final Airway Details  Final airway type: endotracheal airway      Successful airway: ETT  Cuffed: yes   Successful intubation technique: direct laryngoscopy  Endotracheal tube insertion site: oral  Blade: Cristhian  Blade size: #3  ETT size (mm): 7.5    Cormack-Lehane Classification: grade I - full view of glottis  Placement verified by: chest auscultation and capnometry   Measured from: lips  ETT to lips (cm): 21  Number of attempts at approach: 1  Number of other approaches attempted: 0

## 2022-08-19 NOTE — ANESTHESIA PROCEDURE NOTES
Arterial Line  Performed by: Shila Marie DO  Authorized by: Shila Marie DO     General Information and Staff    Procedure Start:  8/19/2022 7:43 AM  Procedure End:  8/19/2022 7:45 AM  Anesthesiologist:  Shila Marie DO  Performed By:  Anesthesiologist  Patient Location:  OR  Indication: continuous blood pressure monitoring and blood sampling needed    Site Identification: surface landmarks    Preanesthetic Checklist: 2 patient identifiers, IV checked, risks and benefits discussed, monitors and equipment checked, pre-op evaluation, timeout performed, anesthesia consent and sterile technique used    Procedure Details    Catheter Size:  20 G  Catheter Length:  1 and 3/4 inchCatheter Type:  Arrow  Seldinger Technique?: Yes    Laterality:  LeftSite:  Radial artery  Site Prep: chlorhexidine  Line Secured:  Wrist Brace, tape and Tegaderm    Assessment    Events: patient tolerated procedure well with no complications      Medications      Additional Comments

## 2022-08-19 NOTE — ANESTHESIA PROCEDURE NOTES
Peripheral IV  Date/Time: 8/19/2022 7:50 AM  Inserted by: Millicent Mendoza DO    Placement  Needle size: 12 G  Laterality: left  Location: hand  Local anesthetic: none  Site prep: alcohol  Technique: anatomical landmarks  Attempts: 1

## 2022-08-19 NOTE — OPERATIVE REPORT
Operative Note     Patient Name:  Janey Nieto  Date: 8/19/2022  Preoperative Diagnosis: Degenerative Scoliosis, Flatback, Lumbar Stenosis  Postoperative Diagnosis: Same  Primary Surgeon: Salvador Bolanos MD  Co-Surgeon: Milad Arellano MD  Cosurgeon was required for efficient work on this difficult case. Data demonstrates 2 spine surgeons have been found to substantially decrease the risk and increase the chances of favorable outcome in complex spine cases.    Assistant: Demarcus MONREAL    Procedures: T10-Pelvis instrumetantion and fusion, SPOs L4-S1, TLIF L4-5, TLIF L5-S1, PTP L1-2    Posterior Instrumentation T10-S1                                                                    CPT 19095   Posterior fusion T10-S1                                                                                   CPT 47992  Pelvic Instrumentation                                                                                     CPT 99082  Lumbar Transforaminal Interbody Fusion                             CPT 70787  Biomechanical Device                                                                                    CPT 22853 x 1  Smith-Nickerson Osteotomy  L4-5, L5-S1                               CPT 67907,   Use of Local Bone Autograft                                                                          CPT 33512   Use of Allograft Material                                                                                  CPT 35014       Additional details of surgery in Dr. Berkley Graves op note (Lateral interbody fusion and L4-5 TLIF)     Anesthesia: General Endotracheal Anesthesia  Estimated Blood Loss: 900cc, 2 units given back  Drains: suprafascial Hemovac #10  Implants:  ATEC 7.5x45, 6.5x45, 8.5x80mm (Pelvic), Size 9 Expandable TLIF interbody device  Bone Graft:  120cc crushed cancellous, Large BMP, 30cc local bone graft, 30cc cortical fibers  Specimen: None  Condition: Stable  Complications: None Surgical Indications:  Blank Del Angel is an 63year old female  who presented with a history of the above diagnosis with symptoms refractory to nonsurgical care. The workup including lumbar radiographs and MRI demonstrating spondylolisthesis with stenosis. The option of surgery was discussed with the patient. Risks, benefits, and alternatives of surgery were discussed with the patient, which include but are not limited to bleeding, infection, DVT/PE, dural tears, neurologic injury, worsening of neurological status, risk of instability, need for subsequent surgery, fracture, failure of fusion, hardware failure, risks associated with anesthesia, including death, which were all reviewed with the patient and she consented to proceed with surgery. Surgical Procedure: The patient was met in the preop holding area, we reviewed elements of the patient's history and physical examination along with the planned surgical procedure. The patient was administered prophylactic antibiotics per SCIP guidelines. After successful induction of general endotracheal anesthesia, a bob catheter was placed and sequential compression devices were applied to bilateral lower extremities. The patient was then placed in the prone position on the Parviz spine frame. The patient's orbits, peripheral nerves, and bony prominences were padded and protected. The back was then prepped and draped in the usual sterile fashion. A safety timeout was performed identifying the patient by name, MRN, and date of birth; the nature of the procedure, surgical site, and concerns were addressed with the operating room staff. All were in agreement and we proceeded with the procedure. A bilateral paraspinal exposure was performed from T10 down to S2. The skin was incised with a scalpel and dissection was carried out with electrocautery.   A subperiosteal bone exposure was conducted to the tips of the transverse processes. Bleeding was controlled with bipolar cautery as well as aquamantas. We turned our attention to the pedicle screw placement. Pedicle screws were placed with standard freehand technique. I placed the screws on the right side Dr. Mayte Nguyen on the left. Initially facetectomies were conducted wide in order to visualize the superior articular process this was conducted with the use of an osteotome and mallet. Bone that was removed from T10 down to S1 was then saved removed of soft tissue and this was milled in the bone mill in order to save for later bone grafting. Of the superior articular process were found a rongeur was used to remove the most dorsal aspect of the superior articular process in order to visualize the trabecular bone of the pedicle lenky probe was then used to cannulate the pedicles a ball-tipped probe was used to feel the bony tunnel of the pedicles and measured the length of the pedicle screw. And pedicle screw screws were then placed using power. Once all pedicle screws were placed AP lateral fluoroscopic image was taken in order to ensure appropriate position of all screws which indeed was appropriate. EMG probe was used to stimulate screws and all screws stimulated greater than 10 mA most at 20 mA. Attention was directed towards placement of pelvic screws pelvic screws were then placed with use of the teardrop technique. Straight Steffee awl was then used to cannulate the pelvis and S2 AI format a K wire was then placed and then placement of the above pelvis screw                  With the appropriate level identified and screws placed, the supraspinous and interspinous ligaments were excised at L5-S1. The caudal half of the spinous process of L 5 and the cephalad half of the spinous process of S1 were denuded of their soft tissues, osteotomized with a bone rongeur, and subsequentlymorselized for later use as local bone autograft.  Additionally, 50% of the L 5 lamina was removed with bilateral inferior facets. The super and medial aspects of the superior articular facet was removed bilaterally and all the ligamentum flavum with use of a kerrison. This completed the Hansa Kirill osteotomy at L5-S1. All bone removed was milled and saved for later bone grafting. The same steps were repeated at L4-5. Both surgeons preformed the osteotomies and helped each other. Attention was directed toward the TLIF. Dr. Bruno Georges performed the TLIF at L4-5 from the right and I performed the TLIF at L5-S1 on the left. Attention was directed to the TLIF at L5-S1. The thecal sac and nerve roots were then identified, protected with neurosponges, and gently retracted to reveal the annulus of the intervertebral disk. An annulotomy was performed. With the sequential use of pituitary rongeurs, disc kaden, a variety of curettes, and rasps, a thorough discectomy ensued with care not to violate the subchondral endplates of the adjacent vertebral bodies. With the endplates prepared, the disc space was then appropriately sized with sequential trialing under fluoroscopic control. The integrity of the anterior annulus was inspected and then biologic allograft and local bone graft was delivered to the anterior disc space. An appropriate corresponding implant was then selected, packed with bone graft on the back table, and then implanted under fluoroscopic control. The remaining disk space was then backfilled with bone graft. In the interim. Dr. Bruno Georges performed the prone LLIF at L1-2 dictated in his operative report. The wound was then copiously irrigated several times throughout the procedure. Meanwhile on the back table, all remaining bone autograft was denuded of soft tissue, morcellized, and combined with remaining allograft.   2 rods of appropriate size were cut and contoured to shape these were then reduced down to the tulip heads first on the concavity of the curve followed by the convexity of the curve. Combination of compression distraction was afforded as well as in situ bending in order to appropriately correct the deformity. Posterior elements in the thoracic spine and the tips of the transverse processes were decorticated with use of a high-speed bur and then bone graft was placed first with strips of BMP followed by the auto and cortical cancellous bone graft in the posterior aspect and in the into the posterior lateral aspect. On the left side a 5.5 cobalt chrome yue was then cut and contoured to shape to span from T12-L1 and the pelvis for 3 yue construct. The construct was the evaluated fluoroscopically and determined to be appropriate. All bleeders were controlled using bipolar electrocautery and floseal.  There was no active bleeding. A total of 1 g of vancomycin powder was evenly distributed throughout the wound. A Hemovac drain was placed through a separate stab incision in the suprafascial manner. Closure was done in layers with interrupted 0 Vicryl for the fascia, 2-0 Vicryl for the subcutaneous tissue. The subcutaneous layer was injected with 0.375% Marcaine and the skin was closed with a Monocryl suture. Dermabond and a sterile dressing were then applied. The patient was woken from anesthesia and transferred to the PACU in stable condition.                    A physician assistant was necessary during the case to provide positioning, exposure, hemostasis, safety and retraction and to manage wound suction so that I could operate with two hands under the surgical Ladonna Garcia MD  Division of Spine Surgery  [address] [place] [address]

## 2022-08-19 NOTE — INTERVAL H&P NOTE
Pre-op Diagnosis: Other idiopathic scoliosis, thoracolumbar region [M41.25]  Lumbar radiculopathy [M54.16]    The above referenced H&P was reviewed by Ladonna Flores MD on 8/19/2022, the patient was examined and no significant changes have occurred in the patient's condition since the H&P was performed. I discussed with the patient and/or legal representative the potential benefits, risks and side effects of this procedure; the likelihood of the patient achieving goals; and potential problems that might occur during recuperation. I discussed reasonable alternatives to the procedure, including risks, benefits and side effects related to the alternatives and risks related to not receiving this procedure. We will proceed with procedure as planned.

## 2022-08-20 ENCOUNTER — APPOINTMENT (OUTPATIENT)
Dept: ULTRASOUND IMAGING | Facility: HOSPITAL | Age: 62
End: 2022-08-20
Attending: INTERNAL MEDICINE
Payer: COMMERCIAL

## 2022-08-20 LAB
ANION GAP SERPL CALC-SCNC: 7 MMOL/L (ref 0–18)
BLOOD TYPE BARCODE: 6200
BUN BLD-MCNC: 8 MG/DL (ref 7–18)
BUN/CREAT SERPL: 10.3 (ref 10–20)
CALCIUM BLD-MCNC: 7.8 MG/DL (ref 8.5–10.1)
CHLORIDE SERPL-SCNC: 107 MMOL/L (ref 98–112)
CO2 SERPL-SCNC: 22 MMOL/L (ref 21–32)
CREAT BLD-MCNC: 0.78 MG/DL
GFR SERPLBLD BASED ON 1.73 SQ M-ARVRAT: 87 ML/MIN/1.73M2 (ref 60–?)
GLUCOSE BLD-MCNC: 121 MG/DL (ref 70–99)
HCT VFR BLD AUTO: 29.4 %
HCT VFR BLD AUTO: 30.1 %
HGB BLD-MCNC: 10 G/DL
HGB BLD-MCNC: 9.9 G/DL
MAGNESIUM SERPL-MCNC: 1.4 MG/DL (ref 1.6–2.6)
OSMOLALITY SERPL CALC.SUM OF ELEC: 282 MOSM/KG (ref 275–295)
POTASSIUM SERPL-SCNC: 3.8 MMOL/L (ref 3.5–5.1)
SODIUM SERPL-SCNC: 136 MMOL/L (ref 136–145)

## 2022-08-20 PROCEDURE — 85014 HEMATOCRIT: CPT | Performed by: PHYSICIAN ASSISTANT

## 2022-08-20 PROCEDURE — 85018 HEMOGLOBIN: CPT | Performed by: ORTHOPAEDIC SURGERY

## 2022-08-20 PROCEDURE — 85018 HEMOGLOBIN: CPT | Performed by: PHYSICIAN ASSISTANT

## 2022-08-20 PROCEDURE — 83735 ASSAY OF MAGNESIUM: CPT | Performed by: INTERNAL MEDICINE

## 2022-08-20 PROCEDURE — 97162 PT EVAL MOD COMPLEX 30 MIN: CPT

## 2022-08-20 PROCEDURE — 97166 OT EVAL MOD COMPLEX 45 MIN: CPT

## 2022-08-20 PROCEDURE — 93970 EXTREMITY STUDY: CPT | Performed by: INTERNAL MEDICINE

## 2022-08-20 PROCEDURE — 80048 BASIC METABOLIC PNL TOTAL CA: CPT | Performed by: INTERNAL MEDICINE

## 2022-08-20 PROCEDURE — 85014 HEMATOCRIT: CPT | Performed by: ORTHOPAEDIC SURGERY

## 2022-08-20 PROCEDURE — 97535 SELF CARE MNGMENT TRAINING: CPT

## 2022-08-20 PROCEDURE — 97530 THERAPEUTIC ACTIVITIES: CPT

## 2022-08-20 RX ORDER — CEPHALEXIN 500 MG/1
500 CAPSULE ORAL 2 TIMES DAILY
Status: COMPLETED | OUTPATIENT
Start: 2022-08-20 | End: 2022-08-21

## 2022-08-20 RX ORDER — POTASSIUM CHLORIDE 20 MEQ/1
40 TABLET, EXTENDED RELEASE ORAL ONCE
Status: COMPLETED | OUTPATIENT
Start: 2022-08-20 | End: 2022-08-20

## 2022-08-20 RX ORDER — MAGNESIUM OXIDE 400 MG/1
800 TABLET ORAL ONCE
Status: COMPLETED | OUTPATIENT
Start: 2022-08-20 | End: 2022-08-20

## 2022-08-20 RX ORDER — PANTOPRAZOLE SODIUM 40 MG/1
40 TABLET, DELAYED RELEASE ORAL
Status: DISCONTINUED | OUTPATIENT
Start: 2022-08-20 | End: 2022-08-23

## 2022-08-20 RX ORDER — ATORVASTATIN CALCIUM 10 MG/1
10 TABLET, FILM COATED ORAL NIGHTLY
Status: DISCONTINUED | OUTPATIENT
Start: 2022-08-20 | End: 2022-08-23

## 2022-08-20 NOTE — CONSULTS
Pod 1 posterior spinal fusion  Pt tolerated procedure well hemodynamically stable  Pt on ketamine drip for post op pain control . to be stopped per service  cpm

## 2022-08-20 NOTE — PLAN OF CARE
Received patient at 1800 from PACU. A/O X4. Great pulses all around. No numbness or tingling. In lots of pain PRNs given. Patient able to sit up and drink water. Art line zeroed. Vital signs remain stable. Will continue to monitor.    Problem: Patient Centered Care  Goal: Patient preferences are identified and integrated in the patient's plan of care  Description: Interventions:  - What would you like us to know as we care for you?   - Provide timely, complete, and accurate information to patient/family  - Incorporate patient and family knowledge, values, beliefs, and cultural backgrounds into the planning and delivery of care  - Encourage patient/family to participate in care and decision-making at the level they choose  - Honor patient and family perspectives and choices  Outcome: Progressing

## 2022-08-20 NOTE — CM/SW NOTE
08/20/22 1300   CM/SW Screening   Referral [id number] staff; Chart review;Nursing rounds   Patient's Current Mental Status at Time of Assessment Alert;Oriented   Patient's [address]   Number of Levels in Home 1   Number of Stair in Home 1 GISSELL   Patient lives with Alone   Patient Status Prior to Admission   Independent with ADLs and Mobility Yes   Services in place prior to admission Other (comment)  (outpt PT)   PT rec is [facility] pending clinical progress. CM spoke with pt and confirmed address and above info. 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 Pt sts she is currently seeing a [facility] as outpt. Pt does not think she will be medically able to do outpt PT at [place] but is agreeable to tentative [facility] ref being sent. Pt wants to contact her current therapist to see if she can continue to see her as a [address] client until pt is able to go to out pt therapy again. CM  requested department  [facility]) to initiate [address] referral for [address], f2f done for RN PT/OT    Plan  [address] pending agency choice and clinical progress    / to remain available for support and/or discharge planning.      Tori Epstein RN    Ext [phone]

## 2022-08-20 NOTE — PLAN OF CARE
Problem: Patient Centered Care  Goal: Patient preferences are identified and integrated in the patient's plan of care  Description: Interventions:  - What would you like us to know as we care for you?   - Provide timely, complete, and accurate information to patient/family  - Incorporate patient and family knowledge, values, beliefs, and cultural backgrounds into the planning and delivery of care  - Encourage patient/family to participate in care and decision-making at the level they choose  - Honor patient and family perspectives and choices  Outcome: Progressing     Problem: Patient/Family Goals  Goal: Patient/Family Long Term Goal  Description: Patient's Long Term Goal:     Interventions:  -   - See additional Care Plan goals for specific interventions  Outcome: Progressing  Goal: Patient/Family Short Term Goal  Description: Patient's Short Term Goal:     Interventions:   -   - See additional Care Plan goals for specific interventions  Outcome: Progressing     Problem: PAIN - ADULT  Goal: Verbalizes/displays adequate comfort level or patient's stated pain goal  Description: INTERVENTIONS:  - Encourage pt to monitor pain and request assistance  - Assess pain using appropriate pain scale  - Administer analgesics based on type and severity of pain and evaluate response  - Implement non-pharmacological measures as appropriate and evaluate response  - Consider cultural and social influences on pain and pain management  - Manage/alleviate anxiety  - Utilize distraction and/or relaxation techniques  - Monitor for opioid side effects  - Notify MD/LIP if interventions unsuccessful or patient reports new pain  - Anticipate increased pain with activity and pre-medicate as appropriate  Outcome: Progressing     Problem: RISK FOR INFECTION - ADULT  Goal: Absence of fever/infection during anticipated neutropenic period  Description: INTERVENTIONS  - Monitor WBC  - Administer growth factors as ordered  - Implement neutropenic guidelines  Outcome: Progressing     Problem: SAFETY ADULT - FALL  Goal: Free from fall injury  Description: INTERVENTIONS:  - Assess pt frequently for physical needs  - Identify cognitive and physical deficits and behaviors that affect risk of falls. - [place] fall precautions as indicated by assessment.  - Educate pt/family on patient safety including physical limitations  - Instruct pt to call for assistance with activity based on assessment  - Modify environment to reduce risk of injury  - Provide assistive devices as appropriate  - Consider OT/PT consult to assist with strengthening/mobility  - Encourage toileting schedule  Outcome: Progressing     A/Ox4. Pt c/o severe pain on Ketamine gtt. PRN Dilaudid for pain management. This am pt feels better. Neurovascular intact no change. Slight numbness/tingling BLE intermittently. Encouraged pt to move BLE. Turning and repositioning frequently as pt requests. Also bed on turn assist.   Hemovac draining bloody output. Dressing C/D/I. Call light within reach.

## 2022-08-20 NOTE — PLAN OF CARE
Problem: PAIN - ADULT  Goal: Verbalizes/displays adequate comfort level or patient's stated pain goal  Description: INTERVENTIONS:  - Encourage pt to monitor pain and request assistance  - Assess pain using appropriate pain scale  - Administer analgesics based on type and severity of pain and evaluate response  - Implement non-pharmacological measures as appropriate and evaluate response  - Consider cultural and social influences on pain and pain management  - Manage/alleviate anxiety  - Utilize distraction and/or relaxation techniques  - Monitor for opioid side effects  - Notify MD/LIP if interventions unsuccessful or patient reports new pain  - Anticipate increased pain with activity and pre-medicate as appropriate  Outcome: Not Progressing     Problem: RISK FOR INFECTION - ADULT  Goal: Absence of fever/infection during anticipated neutropenic period  Description: INTERVENTIONS  - Monitor WBC  - Administer growth factors as ordered  - Implement neutropenic guidelines  Outcome: Progressing     Problem: SAFETY ADULT - FALL  Goal: Free from fall injury  Description: INTERVENTIONS:  - Assess pt frequently for physical needs  - Identify cognitive and physical deficits and behaviors that affect risk of falls.   - [place] fall precautions as indicated by assessment.  - Educate pt/family on patient safety including physical limitations  - Instruct pt to call for assistance with activity based on assessment  - Modify environment to reduce risk of injury  - Provide assistive devices as appropriate  - Consider OT/PT consult to assist with strengthening/mobility  - Encourage toileting schedule  Outcome: Progressing

## 2022-08-20 NOTE — CM/SW NOTE
Department  notified of request for [facility] referrals started. Assigned CM/SW to follow up with pt/family on further discharge planning.      Eveline Miles  [facility]

## 2022-08-21 ENCOUNTER — APPOINTMENT (OUTPATIENT)
Dept: GENERAL RADIOLOGY | Facility: HOSPITAL | Age: 62
End: 2022-08-21
Attending: PHYSICIAN ASSISTANT
Payer: COMMERCIAL

## 2022-08-21 LAB
HCT VFR BLD AUTO: 30.4 %
HGB BLD-MCNC: 9.2 G/DL
MAGNESIUM SERPL-MCNC: 1.5 MG/DL (ref 1.6–2.6)
POTASSIUM SERPL-SCNC: 3.8 MMOL/L (ref 3.5–5.1)

## 2022-08-21 PROCEDURE — 72100 X-RAY EXAM L-S SPINE 2/3 VWS: CPT | Performed by: PHYSICIAN ASSISTANT

## 2022-08-21 PROCEDURE — 72082 X-RAY EXAM ENTIRE SPI 2/3 VW: CPT | Performed by: PHYSICIAN ASSISTANT

## 2022-08-21 PROCEDURE — 85018 HEMOGLOBIN: CPT | Performed by: PHYSICIAN ASSISTANT

## 2022-08-21 PROCEDURE — 84132 ASSAY OF SERUM POTASSIUM: CPT | Performed by: INTERNAL MEDICINE

## 2022-08-21 PROCEDURE — 85014 HEMATOCRIT: CPT | Performed by: PHYSICIAN ASSISTANT

## 2022-08-21 PROCEDURE — 83735 ASSAY OF MAGNESIUM: CPT | Performed by: INTERNAL MEDICINE

## 2022-08-21 RX ORDER — MAGNESIUM OXIDE 400 MG/1
800 TABLET ORAL ONCE
Status: COMPLETED | OUTPATIENT
Start: 2022-08-21 | End: 2022-08-21

## 2022-08-21 RX ORDER — POTASSIUM CHLORIDE 20 MEQ/1
40 TABLET, EXTENDED RELEASE ORAL ONCE
Status: COMPLETED | OUTPATIENT
Start: 2022-08-21 | End: 2022-08-21

## 2022-08-21 NOTE — PLAN OF CARE
Patient is A&Ox4, RA, pain being controlled with oxy and PRN dilaudid  Up in chair at shift change. Hemovac in place. Frequent nursing rounds preformed. Safety measures maintained. Call light within reach. Bed alarm on.  Plan is to be determined  Problem: Patient Centered Care  Goal: Patient preferences are identified and integrated in the patient's plan of care  Description: Interventions:  - What would you like us to know as we care for you?   - Provide timely, complete, and accurate information to patient/family  - Incorporate patient and family knowledge, values, beliefs, and cultural backgrounds into the planning and delivery of care  - Encourage patient/family to participate in care and decision-making at the level they choose  - Honor patient and family perspectives and choices  Outcome: Progressing     Problem: Patient/Family Goals  Goal: Patient/Family Long Term Goal  Description: Patient's Long Term Goal:     Interventions:  -   - See additional Care Plan goals for specific interventions  Outcome: Progressing  Goal: Patient/Family Short Term Goal  Description: Patient's Short Term Goal:     Interventions:   -   - See additional Care Plan goals for specific interventions  Outcome: Progressing     Problem: PAIN - ADULT  Goal: Verbalizes/displays adequate comfort level or patient's stated pain goal  Description: INTERVENTIONS:  - Encourage pt to monitor pain and request assistance  - Assess pain using appropriate pain scale  - Administer analgesics based on type and severity of pain and evaluate response  - Implement non-pharmacological measures as appropriate and evaluate response  - Consider cultural and social influences on pain and pain management  - Manage/alleviate anxiety  - Utilize distraction and/or relaxation techniques  - Monitor for opioid side effects  - Notify MD/LIP if interventions unsuccessful or patient reports new pain  - Anticipate increased pain with activity and pre-medicate as appropriate  Outcome: Progressing     Problem: RISK FOR INFECTION - ADULT  Goal: Absence of fever/infection during anticipated neutropenic period  Description: INTERVENTIONS  - Monitor WBC  - Administer growth factors as ordered  - Implement neutropenic guidelines  Outcome: Progressing     Problem: SAFETY ADULT - FALL  Goal: Free from fall injury  Description: INTERVENTIONS:  - Assess pt frequently for physical needs  - Identify cognitive and physical deficits and behaviors that affect risk of falls.   - [place] fall precautions as indicated by assessment.  - Educate pt/family on patient safety including physical limitations  - Instruct pt to call for assistance with activity based on assessment  - Modify environment to reduce risk of injury  - Provide assistive devices as appropriate  - Consider OT/PT consult to assist with strengthening/mobility  - Encourage toileting schedule  Outcome: Progressing

## 2022-08-21 NOTE — PROGRESS NOTES
Double RN skin check done prior to transfer off unit. Skin check performed by this RN and Ha Camargo. Wounds are as follows: Patient has surgical scar with hemovac drain. Not visible due to surgical dressing. No other wounds. Mepilex on sacrum. Will remain available for any other questions or concerns.

## 2022-08-21 NOTE — PLAN OF CARE
Patient remains on ketamine gtt and frequent PRN dilaudid given for pain in back/left leg. VSS. Up in chair at shift change. Hemovac remains open to gravity drainage. Frequent nursing rounds preformed. Safety measures maintained. Call light within reach. Bed alarm on    Problem: Patient Centered Care  Goal: Patient preferences are identified and integrated in the patient's plan of care  Description: Interventions:  - What would you like us to know as we care for you?   - Provide timely, complete, and accurate information to patient/family  - Incorporate patient and family knowledge, values, beliefs, and cultural backgrounds into the planning and delivery of care  - Encourage patient/family to participate in care and decision-making at the level they choose  - Honor patient and family perspectives and choices  Outcome: Progressing     Problem: Patient/Family Goals  Goal: Patient/Family Long Term Goal  Description: Patient's Long Term Goal:     Interventions:  - See additional Care Plan goals for specific interventions  Outcome: Progressing  Goal: Patient/Family Short Term Goal  Description: Patient's Short Term Goal:     Interventions:   - See additional Care Plan goals for specific interventions  Outcome: Progressing     Problem: RISK FOR INFECTION - ADULT  Goal: Absence of fever/infection during anticipated neutropenic period  Description: INTERVENTIONS  - Monitor WBC  - Administer growth factors as ordered  - Implement neutropenic guidelines  Outcome: Progressing     Problem: SAFETY ADULT - FALL  Goal: Free from fall injury  Description: INTERVENTIONS:  - Assess pt frequently for physical needs  - Identify cognitive and physical deficits and behaviors that affect risk of falls.   - [place] fall precautions as indicated by assessment.  - Educate pt/family on patient safety including physical limitations  - Instruct pt to call for assistance with activity based on assessment  - Modify environment to reduce risk of injury  - Provide assistive devices as appropriate  - Consider OT/PT consult to assist with strengthening/mobility  - Encourage toileting schedule  Outcome: Progressing     Problem: PAIN - ADULT  Goal: Verbalizes/displays adequate comfort level or patient's stated pain goal  Description: INTERVENTIONS:  - Encourage pt to monitor pain and request assistance  - Assess pain using appropriate pain scale  - Administer analgesics based on type and severity of pain and evaluate response  - Implement non-pharmacological measures as appropriate and evaluate response  - Consider cultural and social influences on pain and pain management  - Manage/alleviate anxiety  - Utilize distraction and/or relaxation techniques  - Monitor for opioid side effects  - Notify MD/LIP if interventions unsuccessful or patient reports new pain  - Anticipate increased pain with activity and pre-medicate as appropriate  Outcome: Not Progressing

## 2022-08-21 NOTE — PLAN OF CARE
Problem: PAIN - ADULT  Goal: Verbalizes/displays adequate comfort level or patient's stated pain goal  Description: INTERVENTIONS:  - Encourage pt to monitor pain and request assistance  - Assess pain using appropriate pain scale  - Administer analgesics based on type and severity of pain and evaluate response  - Implement non-pharmacological measures as appropriate and evaluate response  - Consider cultural and social influences on pain and pain management  - Manage/alleviate anxiety  - Utilize distraction and/or relaxation techniques  - Monitor for opioid side effects  - Notify MD/LIP if interventions unsuccessful or patient reports new pain  - Anticipate increased pain with activity and pre-medicate as appropriate  Outcome: Progressing     Problem: RISK FOR INFECTION - ADULT  Goal: Absence of fever/infection during anticipated neutropenic period  Description: INTERVENTIONS  - Monitor WBC  - Administer growth factors as ordered  - Implement neutropenic guidelines  Outcome: Progressing     Problem: SAFETY ADULT - FALL  Goal: Free from fall injury  Description: INTERVENTIONS:  - Assess pt frequently for physical needs  - Identify cognitive and physical deficits and behaviors that affect risk of falls.   - [place] fall precautions as indicated by assessment.  - Educate pt/family on patient safety including physical limitations  - Instruct pt to call for assistance with activity based on assessment  - Modify environment to reduce risk of injury  - Provide assistive devices as appropriate  - Consider OT/PT consult to assist with strengthening/mobility  - Encourage toileting schedule  Outcome: Progressing

## 2022-08-21 NOTE — PROGRESS NOTES
Patient has Hemovac drain in place and drain was clamped today from [id number] per orthopedic PA/MD orders. When PM RN came into shift this evening and unclamped the drain, patient had 550 mL out of Hemovac in an hour. Page was sent out to on call Yane MONREAL, who instructed this RN to clamp drain immediately and get a STAT H&H. Orders for blood work placed and drain is currently clamped. PA told RN she would talk to Dr. Nilsa Ortega and instruct RN further. RN will continue to monitor.

## 2022-08-22 LAB
MAGNESIUM SERPL-MCNC: 1.7 MG/DL (ref 1.6–2.6)
POTASSIUM SERPL-SCNC: 3.7 MMOL/L (ref 3.5–5.1)

## 2022-08-22 PROCEDURE — 97116 GAIT TRAINING THERAPY: CPT

## 2022-08-22 PROCEDURE — 83735 ASSAY OF MAGNESIUM: CPT | Performed by: ORTHOPAEDIC SURGERY

## 2022-08-22 PROCEDURE — 84132 ASSAY OF SERUM POTASSIUM: CPT | Performed by: ORTHOPAEDIC SURGERY

## 2022-08-22 PROCEDURE — 97530 THERAPEUTIC ACTIVITIES: CPT

## 2022-08-22 PROCEDURE — 97110 THERAPEUTIC EXERCISES: CPT

## 2022-08-22 NOTE — PLAN OF CARE
Sarah Smith is alert and oriented. Remote tele monitoring maintained. Ambulates to bathroom with 1 assist and walker. Patient is voiding freely, Hemovac output is less than previous. Taking Flexeril, Oxy IR orally.  Plan is to go home with home health once medically cleared  Problem: Patient Centered Care  Goal: Patient preferences are identified and integrated in the patient's plan of care  Description: Interventions:  - What would you like us to know as we care for you?   - Provide timely, complete, and accurate information to patient/family  - Incorporate patient and family knowledge, values, beliefs, and cultural backgrounds into the planning and delivery of care  - Encourage patient/family to participate in care and decision-making at the level they choose  - Honor patient and family perspectives and choices  Outcome: Progressing     Problem: Patient/Family Goals  Goal: Patient/Family Long Term Goal  Description: Patient's Long Term Goal:     Interventions:  -   - See additional Care Plan goals for specific interventions  Outcome: Progressing  Goal: Patient/Family Short Term Goal  Description: Patient's Short Term Goal:     Interventions:   -   - See additional Care Plan goals for specific interventions  Outcome: Progressing     Problem: PAIN - ADULT  Goal: Verbalizes/displays adequate comfort level or patient's stated pain goal  Description: INTERVENTIONS:  - Encourage pt to monitor pain and request assistance  - Assess pain using appropriate pain scale  - Administer analgesics based on type and severity of pain and evaluate response  - Implement non-pharmacological measures as appropriate and evaluate response  - Consider cultural and social influences on pain and pain management  - Manage/alleviate anxiety  - Utilize distraction and/or relaxation techniques  - Monitor for opioid side effects  - Notify MD/LIP if interventions unsuccessful or patient reports new pain  - Anticipate increased pain with activity and pre-medicate as appropriate  Outcome: Progressing     Problem: RISK FOR INFECTION - ADULT  Goal: Absence of fever/infection during anticipated neutropenic period  Description: INTERVENTIONS  - Monitor WBC  - Administer growth factors as ordered  - Implement neutropenic guidelines  Outcome: Progressing     Problem: SAFETY ADULT - FALL  Goal: Free from fall injury  Description: INTERVENTIONS:  - Assess pt frequently for physical needs  - Identify cognitive and physical deficits and behaviors that affect risk of falls.   - [place] fall precautions as indicated by assessment.  - Educate pt/family on patient safety including physical limitations  - Instruct pt to call for assistance with activity based on assessment  - Modify environment to reduce risk of injury  - Provide assistive devices as appropriate  - Consider OT/PT consult to assist with strengthening/mobility  - Encourage toileting schedule  Outcome: Progressing

## 2022-08-22 NOTE — PHYSICAL THERAPY NOTE
PHYSICAL THERAPY TREATMENT NOTE - INPATIENT     Room Number: 427/427-A       Presenting Problem: L4-S1 interbody fusion     Problem List  Active Problems:    Preop testing      PHYSICAL THERAPY ASSESSMENT   Chart reviewed. SIMÓN Grider approved participation in physical therapy. PPE worn by therapist: mask, gloves and goggles. Patient was wearing a mask during session. Patient presented in bed with 6/10 pain. Patient with good  progress towards goals during this session. Education provided on Physical therapy plan of care and physiological benefits of out of bed mobility. Patient with good carryover. Bed mobility: Min assist  Transfers: Min assist  Gait Assistance: Minimum assistance  Distance (ft): 2x50  Assistive Device: Rolling walker  Pattern: Shuffle;L Foot drop          Per pt request ,pt seen BID. Min a for bed mobility and transfer. EOB sitting balance activity with emphasis on core stabilization;spinal precautions reviewed. Education. Pt amb 2 x 50 ft with RW and MIN A;provided cuing for gait pattern as well as for postural awareness. Ther ex. Pt educated on deep breathing as well as energy conservation technique. . Patient was left in bedside chair at end of session with all needs in reach. The patient's Approx Degree of Impairment: 61.29% has been calculated based on documentation in the [facility] '6 clicks' Inpatient Basic Mobility Short Form. Research supports that patients with this level of impairment may benefit from Home with home health PT.  RN aware of patient status post session. DISCHARGE RECOMMENDATIONS  PT Discharge Recommendations: Home with home health PT     PLAN  PT Treatment Plan: Bed mobility; Endurance;Gait training    SUBJECTIVE  Pt reports being ready for PT RX    OBJECTIVE  Precautions: Spine    WEIGHT BEARING RESTRICTION  Weight Bearing Restriction: None                PAIN ASSESSMENT   Ratin  Location: spine pain unrated        BALANCE Static Sitting: Fair +  Dynamic Sitting: Fair           Static Standing: Poor +  Dynamic Standing: Poor    ACTIVITY TOLERANCE                         O2 WALK       AM-PAC '6-Clicks' INPATIENT SHORT FORM - BASIC MOBILITY  How much difficulty does the patient currently have. .. Patient Difficulty: Turning over in bed (including adjusting bedclothes, sheets and blankets)?: A Little   Patient Difficulty: Sitting down on and standing up from a chair with arms (e.g., wheelchair, bedside commode, etc.): A Lot   Patient Difficulty: Moving from lying on back to sitting on the side of the bed?: A Little   How much help from another person does the patient currently need. .. Help from Another: Moving to and from a bed to a chair (including a wheelchair)?: A Lot   Help from Another: Need to walk in hospital room?: A Lot   Help from Another: Climbing 3-5 steps with a railing?: A Lot     AM-PAC Score:  Raw Score: 14   Approx Degree of Impairment: 61.29%   Standardized Score (AM-PAC Scale): 38.1   CMS Modifier (G-Code): CL      Additional information:     THERAPEUTIC EXERCISES  Lower Extremity Ankle pumps  Glut sets  Heel slides  Quad sets     Position Supine       Patient End of Session: Up in chair;Call light within reach;RN aware of session/findings;Bracing education provided per handout; All patient questions and concerns addressed    CURRENT GOALS     Patient Goal Patient's self-stated goal is: unstated    Goal #1 Patient is able to demonstrate supine - sit EOB @ level: supervision     Goal #1   Current Status Min a   Goal #2 Patient is able to demonstrate transfers Sit to/from Stand at assistance level: supervision with walker - rolling     Goal #2  Current Status Min a   Goal #3 Patient is able to ambulate 50+ feet with assist device: walker - rolling at assistance level: supervision   Goal #3   Current Status Pt amb 2 x 50 ft with RW and min a   Goal #4 Patient will negotiate 7 stairs/one curb w/ assistive device and supervision   Goal #4   Current Status NT   Goal #5 Patient to demonstrate independence with home activity/exercise instructions provided to patient in preparation for discharge.    Goal #5   Current Status In progress   Goal #6    Goal #6  Current Status

## 2022-08-22 NOTE — PLAN OF CARE
Jules Olson is alert and oriented. Remote tele monitoring maintained. Ambulates to bathroom with 1 assist and walker. [place]o patent and plan to remove this morning. Hemovac output is less than previous. Taking Flexeril, Oxy IR orally and IV Dilaudid as needed for pain control. Plan to try to transition off the IV Dilaudid to start prepping for discharge home.   Problem: Patient Centered Care  Goal: Patient preferences are identified and integrated in the patient's plan of care  Description: Interventions:  - What would you like us to know as we care for you?   - Provide timely, complete, and accurate information to patient/family  - Incorporate patient and family knowledge, values, beliefs, and cultural backgrounds into the planning and delivery of care  - Encourage patient/family to participate in care and decision-making at the level they choose  - Honor patient and family perspectives and choices  Outcome: Progressing     Problem: Patient/Family Goals  Goal: Patient/Family Long Term Goal  Description: Patient's Long Term Goal:     Interventions:  -   - See additional Care Plan goals for specific interventions  Outcome: Progressing  Goal: Patient/Family Short Term Goal  Description: Patient's Short Term Goal:     Interventions:   -  - See additional Care Plan goals for specific interventions  Outcome: Progressing     Problem: PAIN - ADULT  Goal: Verbalizes/displays adequate comfort level or patient's stated pain goal  Description: INTERVENTIONS:  - Encourage pt to monitor pain and request assistance  - Assess pain using appropriate pain scale  - Administer analgesics based on type and severity of pain and evaluate response  - Implement non-pharmacological measures as appropriate and evaluate response  - Consider cultural and social influences on pain and pain management  - Manage/alleviate anxiety  - Utilize distraction and/or relaxation techniques  - Monitor for opioid side effects  - Notify MD/LIP if interventions unsuccessful or patient reports new pain  - Anticipate increased pain with activity and pre-medicate as appropriate  Outcome: Progressing     Problem: RISK FOR INFECTION - ADULT  Goal: Absence of fever/infection during anticipated neutropenic period  Description: INTERVENTIONS  - Monitor WBC  - Administer growth factors as ordered  - Implement neutropenic guidelines  Outcome: Progressing     Problem: SAFETY ADULT - FALL  Goal: Free from fall injury  Description: INTERVENTIONS:  - Assess pt frequently for physical needs  - Identify cognitive and physical deficits and behaviors that affect risk of falls.   - [place] fall precautions as indicated by assessment.  - Educate pt/family on patient safety including physical limitations  - Instruct pt to call for assistance with activity based on assessment  - Modify environment to reduce risk of injury  - Provide assistive devices as appropriate  - Consider OT/PT consult to assist with strengthening/mobility  - Encourage toileting schedule  Outcome: Progressing

## 2022-08-23 ENCOUNTER — APPOINTMENT (OUTPATIENT)
Dept: CT IMAGING | Facility: HOSPITAL | Age: 62
End: 2022-08-23
Attending: ORTHOPAEDIC SURGERY
Payer: COMMERCIAL

## 2022-08-23 VITALS
TEMPERATURE: 98 F | BODY MASS INDEX: 26.14 KG/M2 | DIASTOLIC BLOOD PRESSURE: 67 MMHG | WEIGHT: 162.69 LBS | HEART RATE: 99 BPM | OXYGEN SATURATION: 96 % | RESPIRATION RATE: 18 BRPM | HEIGHT: 66 IN | SYSTOLIC BLOOD PRESSURE: 134 MMHG

## 2022-08-23 LAB
ANION GAP SERPL CALC-SCNC: 11 MMOL/L (ref 0–18)
BASOPHILS # BLD AUTO: 0.02 X10(3) UL (ref 0–0.2)
BASOPHILS NFR BLD AUTO: 0.3 %
BUN BLD-MCNC: 7 MG/DL (ref 7–18)
BUN/CREAT SERPL: 15.9 (ref 10–20)
CALCIUM BLD-MCNC: 9 MG/DL (ref 8.5–10.1)
CHLORIDE SERPL-SCNC: 96 MMOL/L (ref 98–112)
CO2 SERPL-SCNC: 25 MMOL/L (ref 21–32)
CREAT BLD-MCNC: 0.44 MG/DL
DEPRECATED RDW RBC AUTO: 44.5 FL (ref 35.1–46.3)
EOSINOPHIL # BLD AUTO: 0.1 X10(3) UL (ref 0–0.7)
EOSINOPHIL NFR BLD AUTO: 1.5 %
ERYTHROCYTE [DISTWIDTH] IN BLOOD BY AUTOMATED COUNT: 13.2 % (ref 11–15)
GFR SERPLBLD BASED ON 1.73 SQ M-ARVRAT: 111 ML/MIN/1.73M2 (ref 60–?)
GLUCOSE BLD-MCNC: 90 MG/DL (ref 70–99)
HCT VFR BLD AUTO: 26.4 %
HGB BLD-MCNC: 8.8 G/DL
IMM GRANULOCYTES # BLD AUTO: 0.02 X10(3) UL (ref 0–1)
IMM GRANULOCYTES NFR BLD: 0.3 %
LYMPHOCYTES # BLD AUTO: 1.18 X10(3) UL (ref 1–4)
LYMPHOCYTES NFR BLD AUTO: 17.7 %
MAGNESIUM SERPL-MCNC: 1.6 MG/DL (ref 1.6–2.6)
MCH RBC QN AUTO: 30.7 PG (ref 26–34)
MCHC RBC AUTO-ENTMCNC: 33.3 G/DL (ref 31–37)
MCV RBC AUTO: 92 FL
MONOCYTES # BLD AUTO: 0.67 X10(3) UL (ref 0.1–1)
MONOCYTES NFR BLD AUTO: 10 %
NEUTROPHILS # BLD AUTO: 4.69 X10 (3) UL (ref 1.5–7.7)
NEUTROPHILS # BLD AUTO: 4.69 X10(3) UL (ref 1.5–7.7)
NEUTROPHILS NFR BLD AUTO: 70.2 %
OSMOLALITY SERPL CALC.SUM OF ELEC: 272 MOSM/KG (ref 275–295)
PLATELET # BLD AUTO: 251 10(3)UL (ref 150–450)
POTASSIUM SERPL-SCNC: 3.4 MMOL/L (ref 3.5–5.1)
RBC # BLD AUTO: 2.87 X10(6)UL
SODIUM SERPL-SCNC: 132 MMOL/L (ref 136–145)
WBC # BLD AUTO: 6.7 X10(3) UL (ref 4–11)

## 2022-08-23 PROCEDURE — 97535 SELF CARE MNGMENT TRAINING: CPT

## 2022-08-23 PROCEDURE — 97116 GAIT TRAINING THERAPY: CPT

## 2022-08-23 PROCEDURE — 72131 CT LUMBAR SPINE W/O DYE: CPT | Performed by: ORTHOPAEDIC SURGERY

## 2022-08-23 PROCEDURE — 83735 ASSAY OF MAGNESIUM: CPT | Performed by: INTERNAL MEDICINE

## 2022-08-23 PROCEDURE — 85025 COMPLETE CBC W/AUTO DIFF WBC: CPT | Performed by: INTERNAL MEDICINE

## 2022-08-23 PROCEDURE — 80048 BASIC METABOLIC PNL TOTAL CA: CPT | Performed by: INTERNAL MEDICINE

## 2022-08-23 PROCEDURE — 97530 THERAPEUTIC ACTIVITIES: CPT

## 2022-08-23 PROCEDURE — 97110 THERAPEUTIC EXERCISES: CPT

## 2022-08-23 NOTE — PLAN OF CARE
Tacy Rinne is up with walker and 1 assist. She is voiding. Taking Oxy IR as needed for pain. Per Dr. Farshad Larios has her prescriptions at home. Hemovac removed by Surgeon, dressing changed by surgeon. Plan for home today with home health care. Problem: Patient Centered Care  Goal: Patient preferences are identified and integrated in the patient's plan of care  Description: Interventions:  - What would you like us to know as we care for you?  Has a son named, Emelina Cardenas.   - Provide timely, complete, and accurate information to patient/family  - Incorporate patient and family knowledge, values, beliefs, and cultural backgrounds into the planning and delivery of care  - Encourage patient/family to participate in care and decision-making at the level they choose  - Honor patient and family perspectives and choices  Outcome: Adequate for Discharge     Problem: PAIN - ADULT  Goal: Verbalizes/displays adequate comfort level or patient's stated pain goal  Description: INTERVENTIONS:  - Encourage pt to monitor pain and request assistance  - Assess pain using appropriate pain scale  - Administer analgesics based on type and severity of pain and evaluate response  - Implement non-pharmacological measures as appropriate and evaluate response  - Consider cultural and social influences on pain and pain management  - Manage/alleviate anxiety  - Utilize distraction and/or relaxation techniques  - Monitor for opioid side effects  - Notify MD/LIP if interventions unsuccessful or patient reports new pain  - Anticipate increased pain with activity and pre-medicate as appropriate  Outcome: Adequate for Discharge     Problem: RISK FOR INFECTION - ADULT  Goal: Absence of fever/infection during anticipated neutropenic period  Description: INTERVENTIONS  - Monitor WBC  - Administer growth factors as ordered  - Implement neutropenic guidelines  Outcome: Adequate for Discharge     Problem: SAFETY ADULT - FALL  Goal: Free from fall injury  Description: INTERVENTIONS:  - Assess pt frequently for physical needs  - Identify cognitive and physical deficits and behaviors that affect risk of falls.   - [place] fall precautions as indicated by assessment.  - Educate pt/family on patient safety including physical limitations  - Instruct pt to call for assistance with activity based on assessment  - Modify environment to reduce risk of injury  - Provide assistive devices as appropriate  - Consider OT/PT consult to assist with strengthening/mobility  - Encourage toileting schedule  Outcome: Adequate for Discharge

## 2022-08-23 NOTE — PLAN OF CARE
Problem: Patient Centered Care  Goal: Patient preferences are identified and integrated in the patient's plan of care  Description: Interventions:  - What would you like us to know as we care for you?   - Provide timely, complete, and accurate information to patient/family  - Incorporate patient and family knowledge, values, beliefs, and cultural backgrounds into the planning and delivery of care  - Encourage patient/family to participate in care and decision-making at the level they choose  - Honor patient and family perspectives and choices  Outcome: Progressing     Problem: Patient/Family Goals  Goal: Patient/Family Long Term Goal  Description: Patient's Long Term Goal:     Interventions:  - Administer pain medication  - PT/OT  - See additional Care Plan goals for specific interventions  Outcome: Progressing  Goal: Patient/Family Short Term Goal  Description: Patient's Short Term Goal:     Interventions:   - Administer pain medication  - PT/OT  - Monitor labs  - See additional Care Plan goals for specific interventions  Outcome: Progressing     Problem: PAIN - ADULT  Goal: Verbalizes/displays adequate comfort level or patient's stated pain goal  Description: INTERVENTIONS:  - Encourage pt to monitor pain and request assistance  - Assess pain using appropriate pain scale  - Administer analgesics based on type and severity of pain and evaluate response  - Implement non-pharmacological measures as appropriate and evaluate response  - Consider cultural and social influences on pain and pain management  - Manage/alleviate anxiety  - Utilize distraction and/or relaxation techniques  - Monitor for opioid side effects  - Notify MD/LIP if interventions unsuccessful or patient reports new pain  - Anticipate increased pain with activity and pre-medicate as appropriate  Outcome: Progressing     Problem: RISK FOR INFECTION - ADULT  Goal: Absence of fever/infection during anticipated neutropenic period  Description: INTERVENTIONS  - Monitor WBC  - Administer growth factors as ordered  - Implement neutropenic guidelines  Outcome: Progressing     Problem: SAFETY ADULT - FALL  Goal: Free from fall injury  Description: INTERVENTIONS:  - Assess pt frequently for physical needs  - Identify cognitive and physical deficits and behaviors that affect risk of falls. - [place] fall precautions as indicated by assessment.  - Educate pt/family on patient safety including physical limitations  - Instruct pt to call for assistance with activity based on assessment  - Modify environment to reduce risk of injury  - Provide assistive devices as appropriate  - Consider OT/PT consult to assist with strengthening/mobility  - Encourage toileting schedule  Outcome: Progressing     Shahid Pinto was resting in bed, able to ambulate x 1 assist and a walker in room and to the bathroom. She's voiding freely. Her pain was managed with PRN Oxy and Flexeril at this time. Ice gel pack also applied to the back. She's on tele. Hemovac in place, intact and draining. Plan for discharge back to home with [facility] when medically stable.

## 2022-08-23 NOTE — CM/SW NOTE
SW met w/ pt in her room as f/up for DC Plans: home w/ HH vs Outpatient PT. Per pt, her private PT does not do home visits. Pt is agreeable to [facility] services w/ [facility] - only accepting provider at this time. SW addressed/answered all questions in regards to insurance approval for amount of sessions as well as f/up w/ her surgeon and [facility] for further services. [facility] reserved via [facility] and notified of potential DC today if drain removed. PLAN: Home w/ [facility] - pending med clear       SW/CM to remain available for support and/or discharge planning.          Elias Bran, MSW, [address] [facility]

## 2022-08-23 NOTE — PHYSICAL THERAPY NOTE
PHYSICAL THERAPY TREATMENT NOTE - INPATIENT     Room Number: 427/427-A       Presenting Problem: L4-S1 interbody fusion     Problem List  Active Problems:    Preop testing      PHYSICAL THERAPY ASSESSMENT   Chart reviewed. SIMÓN Grider approved participation in physical therapy. PPE worn by therapist: mask, gloves and goggles. Patient was wearing a mask during session. Patient presented in bed with 6/10 pain. Patient with good  progress towards goals during this session. Education provided on Physical therapy plan of care and physiological benefits of out of bed mobility. Patient with good carryover. Bed mobility: Min assist  Transfers: Min assist  Gait Assistance: Contact guard assist  Distance (ft): 2 x 100  Assistive Device: Rolling walker  Pattern: Shuffle;L Foot drop          Per pt request ,pt seen BID. Min a for bed mobility and transfer. EOB sitting balance activity with emphasis on core stabilization;spinal precautions reviewed. Education. Pt amb 2 x 100 ft with RW and CGA;provided cuing for gait pattern as well as for postural awareness. Constant cuing to inc ED. Navigated 4 stairs with CGA. Ther ex. Pt educated on deep breathing as well as energy conservation technique. . Patient was left in bedside chair at end of session with all needs in reach. The patient's Approx Degree of Impairment: 46.58% has been calculated based on documentation in the [facility] '6 clicks' Inpatient Basic Mobility Short Form. Research supports that patients with this level of impairment may benefit from Home with home health PT.  RN aware of patient status post session. DISCHARGE RECOMMENDATIONS  PT Discharge Recommendations: Home with home health PT     PLAN  PT Treatment Plan: Bed mobility; Endurance;Gait training    SUBJECTIVE  Pt reports being ready for PT RX    OBJECTIVE  Precautions: Spine;Limb alert - left    WEIGHT BEARING RESTRICTION  Weight Bearing Restriction: None                PAIN ASSESSMENT   Ratin  Location: spine pain unrated        BALANCE                                                                                                                       Static Sitting: Fair +  Dynamic Sitting: Fair           Static Standing: Poor +  Dynamic Standing: Poor    ACTIVITY TOLERANCE                         O2 WALK       AM-PAC '6-Clicks' INPATIENT SHORT FORM - BASIC MOBILITY  How much difficulty does the patient currently have. .. Patient Difficulty: Turning over in bed (including adjusting bedclothes, sheets and blankets)?: A Little   Patient Difficulty: Sitting down on and standing up from a chair with arms (e.g., wheelchair, bedside commode, etc.): A Little   Patient Difficulty: Moving from lying on back to sitting on the side of the bed?: A Little   How much help from another person does the patient currently need. .. Help from Another: Moving to and from a bed to a chair (including a wheelchair)?: A Little   Help from Another: Need to walk in hospital room?: A Little   Help from Another: Climbing 3-5 steps with a railing?: A Little     AM-PAC Score:  Raw Score: 18   Approx Degree of Impairment: 46.58%   Standardized Score (AM-PAC Scale): 43.63   CMS Modifier (G-Code): CK      Additional information:     THERAPEUTIC EXERCISES  Lower Extremity Ankle pumps  Glut sets  Heel slides  Quad sets     Position Supine       Patient End of Session: Up in chair;Call light within reach;RN aware of session/findings;Bracing education provided per handout; All patient questions and concerns addressed    CURRENT GOALS     Patient Goal Patient's self-stated goal is: unstated    Goal #1 Patient is able to demonstrate supine - sit EOB @ level: supervision     Goal #1   Current Status Min a   Goal #2 Patient is able to demonstrate transfers Sit to/from Stand at assistance level: supervision with walker - rolling     Goal #2  Current Status Min a   Goal #3 Patient is able to ambulate 50+ feet with assist device: walker - rolling at assistance level: supervision   Goal #3   Current Status Pt amb 2 x 100 ft with RW and CGA   Goal #4 Patient will negotiate 7 stairs/one curb w/ assistive device and supervision   Goal #4   Current Status Navigated 4 stairs with CGA   Goal #5 Patient to demonstrate independence with home activity/exercise instructions provided to patient in preparation for discharge.    Goal #5   Current Status In progress   Goal #6    Goal #6  Current Status

## 2022-08-24 NOTE — CDS QUERY
.How to Answer this Query    1.) Click \"Edit\" button on the toolbar.  2.) Type an \"X\" in the bracket for the diagnosis that applies. (You may also add additional clinical details as you feel necessary to substantiate your response). 3.) Finally click \"Sign\" to complete response. Thank you    . Clinical Significance - Lab Results Associated with Blood Loss  CLINICAL DOCUMENTATION CLARIFICATION FORM  Dear Doctor Edmundo Couch:  Clinical information (provided below) indicates blood loss and abnormal blood counts. For accurate ICD-10-CM code assignment to reflect severity of illness and risk of mortality,  PLEASE (X) ALL DIAGNOSES THAT APPLY. SELECTION BY PROVIDER ONLY    (x)  Acute Blood Loss Anemia    ( )  Chronic Blood Loss Anemia    ( )  Acute on Chronic Blood Loss Anemia    ( )  Postoperative Anemia due to Acute Blood Loss    ( )  Postoperative Anemia due to Chronic Blood Loss    ( )  Low Hemoglobin and/or Hematocrit without Clinical Significance    ( )  Other (please specify):     ( )  Unable to Determine (please comment)       1  Clinical Indicators:  * Preoperative Diagnosis: Other idiopathic scoliosis, thoracolumbar region. Lumbar     Radiculopathy  * Status Post PSF and instrumentation  T10-Pelvis, Right lateral interbody fusion L1-2,     Right TLIF L4-5 and Left TLIF L5-S1, finney radha osteotomy L4-5 and L5-S1,     facetectomy's T10-T4  * Patient's hemoglobin on admission: 11.1 g/dL   * Estimated Blood Loss: 900 mL. Received 2 units PRBC transfusion   * Patient's hemoglobin post op: 8.8 g/dL    If you have any questions, please contact Clinical Documentation  Specialist:  Tacos Keith RN at [phone]     Thank You!     THIS FORM IS A PERMANENT PART OF THE MEDICAL RECORD

## 2022-08-26 ENCOUNTER — HOSPITAL ENCOUNTER (OUTPATIENT)
Dept: ULTRASOUND IMAGING | Facility: HOSPITAL | Age: 62
Discharge: HOME | End: 2022-08-26
Attending: ORTHOPAEDIC SURGERY
Payer: COMMERCIAL

## 2022-08-26 DIAGNOSIS — Z98.1 S/P SPINAL FUSION: ICD-10-CM

## 2022-08-26 PROCEDURE — 93970 EXTREMITY STUDY: CPT | Performed by: ORTHOPAEDIC SURGERY

## 2024-07-06 ENCOUNTER — HOSPITAL ENCOUNTER (INPATIENT)
Facility: HOSPITAL | Age: 64
LOS: 1 days | Discharge: HOME | End: 2024-07-07
Attending: EMERGENCY MEDICINE | Admitting: HOSPITALIST
Payer: COMMERCIAL

## 2024-07-06 ENCOUNTER — HOSPITAL ENCOUNTER (OUTPATIENT)
Facility: HOSPITAL | Age: 64
Setting detail: OBSERVATION
Discharge: HOME | End: 2024-07-07
Attending: EMERGENCY MEDICINE | Admitting: HOSPITALIST
Payer: COMMERCIAL

## 2024-07-06 ENCOUNTER — APPOINTMENT (OUTPATIENT)
Dept: GENERAL RADIOLOGY | Facility: HOSPITAL | Age: 64
End: 2024-07-06
Attending: EMERGENCY MEDICINE
Payer: COMMERCIAL

## 2024-07-06 DIAGNOSIS — K92.2 UPPER GI BLEED: Primary | ICD-10-CM

## 2024-07-06 LAB
ALBUMIN SERPL-MCNC: 3.6 G/DL (ref 3.4–5)
ALBUMIN/GLOB SERPL: 1 {RATIO} (ref 1–2)
ALP LIVER SERPL-CCNC: 110 U/L
ALT SERPL-CCNC: 47 U/L
ANION GAP SERPL CALC-SCNC: 10 MMOL/L (ref 0–18)
ANTIBODY SCREEN: NEGATIVE
AST SERPL-CCNC: 29 U/L (ref 15–37)
ATRIAL RATE: 123 BPM
BASOPHILS # BLD AUTO: 0.08 X10(3) UL (ref 0–0.2)
BASOPHILS NFR BLD AUTO: 0.5 %
BILIRUB SERPL-MCNC: 0.4 MG/DL (ref 0.1–2)
BILIRUB UR QL STRIP.AUTO: NEGATIVE
BUN BLD-MCNC: 25 MG/DL (ref 9–23)
CALCIUM BLD-MCNC: 9.7 MG/DL (ref 8.5–10.1)
CHLORIDE SERPL-SCNC: 98 MMOL/L (ref 98–112)
CO2 SERPL-SCNC: 22 MMOL/L (ref 21–32)
CREAT BLD-MCNC: 0.82 MG/DL
EGFRCR SERPLBLD CKD-EPI 2021: 81 ML/MIN/1.73M2 (ref 60–?)
EOSINOPHIL # BLD AUTO: 0.07 X10(3) UL (ref 0–0.7)
EOSINOPHIL NFR BLD AUTO: 0.5 %
ERYTHROCYTE [DISTWIDTH] IN BLOOD BY AUTOMATED COUNT: 12.9 %
ETHANOL SERPL-MCNC: <3 MG/DL (ref ?–3)
GLOBULIN PLAS-MCNC: 3.7 G/DL (ref 2.8–4.4)
GLUCOSE BLD-MCNC: 124 MG/DL (ref 70–99)
GLUCOSE UR STRIP.AUTO-MCNC: NORMAL MG/DL
HCT VFR BLD AUTO: 29.6 %
HGB BLD-MCNC: 10.7 G/DL
HGB BLD-MCNC: 8.6 G/DL
HGB BLD-MCNC: 9.8 G/DL
HYALINE CASTS #/AREA URNS AUTO: PRESENT /LPF
IMM GRANULOCYTES # BLD AUTO: 0.12 X10(3) UL (ref 0–1)
IMM GRANULOCYTES NFR BLD: 0.8 %
LEUKOCYTE ESTERASE UR QL STRIP.AUTO: 500
LYMPHOCYTES # BLD AUTO: 2.63 X10(3) UL (ref 1–4)
LYMPHOCYTES NFR BLD AUTO: 17.2 %
MCH RBC QN AUTO: 33.1 PG (ref 26–34)
MCHC RBC AUTO-ENTMCNC: 36.1 G/DL (ref 31–37)
MCV RBC AUTO: 91.6 FL
MONOCYTES # BLD AUTO: 0.89 X10(3) UL (ref 0.1–1)
MONOCYTES NFR BLD AUTO: 5.8 %
NEUTROPHILS # BLD AUTO: 11.53 X10 (3) UL (ref 1.5–7.7)
NEUTROPHILS # BLD AUTO: 11.53 X10(3) UL (ref 1.5–7.7)
NEUTROPHILS NFR BLD AUTO: 75.2 %
OSMOLALITY SERPL CALC.SUM OF ELEC: 276 MOSM/KG (ref 275–295)
P AXIS: 40 DEGREES
P-R INTERVAL: 140 MS
PH UR STRIP.AUTO: 6 [PH] (ref 5–8)
PLATELET # BLD AUTO: 322 10(3)UL (ref 150–450)
POTASSIUM SERPL-SCNC: 3.5 MMOL/L (ref 3.5–5.1)
PROT SERPL-MCNC: 7.3 G/DL (ref 6.4–8.2)
PROT UR STRIP.AUTO-MCNC: NEGATIVE MG/DL
Q-T INTERVAL: 322 MS
QRS DURATION: 78 MS
QTC CALCULATION (BEZET): 460 MS
R AXIS: 41 DEGREES
RBC # BLD AUTO: 3.23 X10(6)UL
RBC UR QL AUTO: NEGATIVE
RH BLOOD TYPE: POSITIVE
SODIUM SERPL-SCNC: 130 MMOL/L (ref 136–145)
SP GR UR STRIP.AUTO: 1.02 (ref 1–1.03)
T AXIS: 17 DEGREES
UROBILINOGEN UR STRIP.AUTO-MCNC: NORMAL MG/DL
VENTRICULAR RATE: 123 BPM
WBC # BLD AUTO: 15.3 X10(3) UL (ref 4–11)

## 2024-07-06 PROCEDURE — 86901 BLOOD TYPING SEROLOGIC RH(D): CPT | Performed by: EMERGENCY MEDICINE

## 2024-07-06 PROCEDURE — 93005 ELECTROCARDIOGRAM TRACING: CPT

## 2024-07-06 PROCEDURE — 85018 HEMOGLOBIN: CPT | Performed by: HOSPITALIST

## 2024-07-06 PROCEDURE — 85025 COMPLETE CBC W/AUTO DIFF WBC: CPT | Performed by: EMERGENCY MEDICINE

## 2024-07-06 PROCEDURE — 82272 OCCULT BLD FECES 1-3 TESTS: CPT

## 2024-07-06 PROCEDURE — 80053 COMPREHEN METABOLIC PANEL: CPT | Performed by: EMERGENCY MEDICINE

## 2024-07-06 PROCEDURE — 99285 EMERGENCY DEPT VISIT HI MDM: CPT

## 2024-07-06 PROCEDURE — 93010 ELECTROCARDIOGRAM REPORT: CPT

## 2024-07-06 PROCEDURE — 71045 X-RAY EXAM CHEST 1 VIEW: CPT | Performed by: EMERGENCY MEDICINE

## 2024-07-06 PROCEDURE — 96361 HYDRATE IV INFUSION ADD-ON: CPT

## 2024-07-06 PROCEDURE — 86850 RBC ANTIBODY SCREEN: CPT | Performed by: EMERGENCY MEDICINE

## 2024-07-06 PROCEDURE — 82077 ASSAY SPEC XCP UR&BREATH IA: CPT | Performed by: EMERGENCY MEDICINE

## 2024-07-06 PROCEDURE — 96374 THER/PROPH/DIAG INJ IV PUSH: CPT

## 2024-07-06 PROCEDURE — 86900 BLOOD TYPING SEROLOGIC ABO: CPT | Performed by: EMERGENCY MEDICINE

## 2024-07-06 PROCEDURE — 81001 URINALYSIS AUTO W/SCOPE: CPT | Performed by: EMERGENCY MEDICINE

## 2024-07-06 RX ORDER — LORAZEPAM 1 MG/1
1 TABLET ORAL
Status: DISCONTINUED | OUTPATIENT
Start: 2024-07-06 | End: 2024-07-07

## 2024-07-06 RX ORDER — LORAZEPAM 1 MG/1
2 TABLET ORAL
Status: DISCONTINUED | OUTPATIENT
Start: 2024-07-06 | End: 2024-07-07

## 2024-07-06 RX ORDER — POLYETHYLENE GLYCOL 3350 17 G/17G
17 POWDER, FOR SOLUTION ORAL DAILY PRN
Status: DISCONTINUED | OUTPATIENT
Start: 2024-07-06 | End: 2024-07-07

## 2024-07-06 RX ORDER — FOLIC ACID 1 MG/1
1 TABLET ORAL DAILY
Status: DISCONTINUED | OUTPATIENT
Start: 2024-07-06 | End: 2024-07-07

## 2024-07-06 RX ORDER — DEXTROSE MONOHYDRATE AND SODIUM CHLORIDE 5; .45 G/100ML; G/100ML
INJECTION, SOLUTION INTRAVENOUS CONTINUOUS
Status: ACTIVE | OUTPATIENT
Start: 2024-07-06 | End: 2024-07-06

## 2024-07-06 RX ORDER — PROCHLORPERAZINE EDISYLATE 5 MG/ML
5 INJECTION INTRAMUSCULAR; INTRAVENOUS EVERY 8 HOURS PRN
Status: DISCONTINUED | OUTPATIENT
Start: 2024-07-06 | End: 2024-07-07

## 2024-07-06 RX ORDER — THIAMINE HYDROCHLORIDE 100 MG/ML
100 INJECTION, SOLUTION INTRAMUSCULAR; INTRAVENOUS ONCE
Status: COMPLETED | OUTPATIENT
Start: 2024-07-06 | End: 2024-07-06

## 2024-07-06 RX ORDER — MULTIPLE VITAMINS W/ MINERALS TAB 9MG-400MCG
1 TAB ORAL DAILY
Status: DISCONTINUED | OUTPATIENT
Start: 2024-07-06 | End: 2024-07-07

## 2024-07-06 RX ORDER — ALENDRONATE SODIUM 70 MG/1
70 TABLET ORAL WEEKLY
COMMUNITY
Start: 2024-07-03

## 2024-07-06 RX ORDER — CALCIUM CARBONATE 500 MG/1
2 TABLET, CHEWABLE ORAL DAILY
COMMUNITY

## 2024-07-06 RX ORDER — ONDANSETRON 2 MG/ML
4 INJECTION INTRAMUSCULAR; INTRAVENOUS EVERY 6 HOURS PRN
Status: DISCONTINUED | OUTPATIENT
Start: 2024-07-06 | End: 2024-07-07

## 2024-07-06 RX ORDER — SENNOSIDES 8.6 MG
17.2 TABLET ORAL NIGHTLY PRN
Status: DISCONTINUED | OUTPATIENT
Start: 2024-07-06 | End: 2024-07-07

## 2024-07-06 RX ORDER — BEBTELOVIMAB 87.5 MG/ML
175 INJECTION, SOLUTION INTRAVENOUS ONCE
Status: DISCONTINUED | OUTPATIENT
Start: 2024-07-06 | End: 2024-07-06

## 2024-07-06 RX ORDER — MELATONIN
100 DAILY
Status: DISCONTINUED | OUTPATIENT
Start: 2024-07-07 | End: 2024-07-07

## 2024-07-06 NOTE — ED QUICK NOTES
Orders for admission, patient is aware of plan and ready to go upstairs. Any questions, please call ED RN Guillaume at extension [phone].     Patient Covid vaccination status: Fully vaccinated     COVID Test Ordered in ED: None    COVID Suspicion at Admission: N/A    Running Infusions:  None    Mental Status/LOC at time of transport: A&Ox4, ambulatory    Other pertinent information:   CIWA score: N/A   NIH score:  N/A

## 2024-07-06 NOTE — PLAN OF CARE
Assumed care of pt from ER- pt is a&0x4, denies pain, on RA, continuous cardiac and Spo2 monitoring is in place, PIV is saline locked, up with SBA for safety. Safety precautions are in place.  [facility] protocol ordered    Problem: SAFETY ADULT - FALL  Goal: Free from fall injury  Description: INTERVENTIONS:  - Assess pt frequently for physical needs  - Identify cognitive and physical deficits and behaviors that affect risk of falls.  - Henry fall precautions as indicated by assessment.  - Educate pt/family on patient safety including physical limitations  - Instruct pt to call for assistance with activity based on assessment  - Modify environment to reduce risk of injury  - Provide assistive devices as appropriate  - Consider OT/PT consult to assist with strengthening/mobility  - Encourage toileting schedule  Outcome: Progressing     Problem: GASTROINTESTINAL - ADULT  Goal: Minimal or absence of nausea and vomiting  Description: INTERVENTIONS:  - Maintain adequate hydration with IV or PO as ordered and tolerated  - Nasogastric tube to low intermittent suction as ordered  - Evaluate effectiveness of ordered antiemetic medications  - Provide nonpharmacologic comfort measures as appropriate  - Advance diet as tolerated, if ordered  - Obtain nutritional consult as needed  - Evaluate fluid balance  Outcome: Progressing  Goal: Maintains or returns to baseline bowel function  Description: INTERVENTIONS:  - Assess bowel function  - Maintain adequate hydration with IV or PO as ordered and tolerated  - Evaluate effectiveness of GI medications  - Encourage mobilization and activity  - Obtain nutritional consult as needed  - Establish a toileting routine/schedule  - Consider collaborating with pharmacy to review patient's medication profile  Outcome: Progressing     Problem: HEMATOLOGIC - ADULT  Goal: Maintains hematologic stability  Description: INTERVENTIONS  - Assess for signs and symptoms of bleeding or hemorrhage  -  Monitor labs and vital signs for trends  - Administer supportive blood products/factors, fluids and medications as ordered and appropriate  - Administer supportive blood products/factors as ordered and appropriate  Outcome: Progressing     Problem: MUSCULOSKELETAL - ADULT  Goal: Return mobility to safest level of function  Description: INTERVENTIONS:  - Assess patient stability and activity tolerance for standing, transferring and ambulating w/ or w/o assistive devices  - Assist with transfers and ambulation using safe patient handling equipment as needed  - Ensure adequate protection for wounds/incisions during mobilization  - Obtain PT/OT consults as needed  - Advance activity as appropriate  - Communicate ordered activity level and limitations with patient/family  Outcome: Progressing     Problem: NEUROLOGICAL - ADULT  Goal: Absence of seizures  Description: INTERVENTIONS  - Monitor for seizure activity  - Administer anti-seizure medications as ordered  - Monitor neurological status  Outcome: Progressing  Goal: Remains free of injury related to seizure activity  Description: INTERVENTIONS:  - Maintain airway, patient safety  and administer oxygen as ordered  - Monitor patient for seizure activity, document and report duration and description of seizure to MD/LIP  - If seizure occurs, turn patient to side and suction secretions as needed  - Reorient patient post seizure  - Seizure pads on all 4 side rails  - Instruct patient/family to notify RN of any seizure activity  - Instruct patient/family to call for assistance with activity based on assessment  Outcome: Progressing

## 2024-07-06 NOTE — H&P
[facility] History and Physical     Soila Chaves Patient Status:  Emergency    1960 MRN [id number]   Location [facility] EMERGENCY DEPARTMENT Attending Cora Rockwell,    Hosp Day # 0 PCP Mitra Bernal MD     Chief Complaint: coffee ground emesis, melena    Subjective:    Soila Chaves is a 64 year old female with mmp including but not limited to HL, PUD , alcohol use disorder, chronic active smoker, is admitted for coffee ground emesis, melena that began today. No abdominal pain. +LH. No cp/sob/f/c/dysuria. Family at bedside--who state they made her come to the hospital, she did not want to go.        History/Other:        History/Other:    Past Medical History:  Past Medical History:    Colon polyps    due  ????    High cholesterol    primary care - atorvastatin    History of stomach ulcers    Osteoarthritis    Osteoporosis    Plantar fasciitis    resolved with PT    Smoker    Visual impairment    readers     Past Surgical History:   Past Surgical History:   Procedure Laterality Date          Colonoscopy  14 - AIMEE Campbell    adenoma, diverticuli, hemorrhoids, repeat  w/split prep.     Colonoscopy  2014    repeat 2019    Colonoscopy N/A 2019    Procedure: COLONOSCOPY, POSSIBLE BIOPSY, POSSIBLE POLYPECTOMY 81454;  Surgeon: Lalitha Odonnell MD;  Location: [facility] SURGICAL [place], [facility]    Colonoscopy,jeannine conde,snare  2011    [facility]. 5 large adenomas    Other surgical history      lipoma, left side    Spinal fusion  2022: Posterior instrumentation fusion Thoracic 10, Thoracic 11, Thoracic 12, Lumbar 1, Lumbar 2, Lumbar 3, Lumbar 4, Lumbar 5, to pelvis, extreme lateral interbody fusion lumbar 1-lumbar 2-lumbar 3-lumbar 4, transforaminal lumbar interbody fusion lumbar 4-lumbar 5, lumbar 5-sacrum 1 - Zoey & Jose Armando      Family History:   Family History   Problem Relation Age of Onset    Cancer Father         lung     Breast Cancer Mother 78        78    Breast Cancer Sister 59        59    Breast Cancer Maternal Aunt 60        60    Heart Disorder Neg     Diabetes Neg      Social History:    reports that she quit smoking about 23 months ago. She started smoking about 19 years ago. She has a 9 pack-year smoking history. She has never used smokeless tobacco. She reports current alcohol use. She reports that she does not use drugs.       Allergies:   Allergies   Allergen Reactions    Oxycodone NAUSEA ONLY    Oxycodone-Aspirin NAUSEA ONLY       Medications:    No current facility-administered medications on file prior to encounter.     Current Outpatient Medications on File Prior to Encounter   Medication Sig Dispense Refill    alendronate 70 MG Oral Tab Take 1 tablet (70 mg total) by mouth once a week.      ATORVASTATIN 10 MG Oral Tab TAKE ONE TABLET BY MOUTH ONE TIME DAILY (Patient taking differently: 1 tablet (10 mg total) nightly.) 90 tablet 0    aspirin 81 MG Oral Tab Take 1 tablet (81 mg total) by mouth daily.      Cholecalciferol (VITAMIN D) 1000 UNITS Oral Tab Take  by mouth.         Review of Systems:   A comprehensive 14 point review of systems was completed.    Pertinent positives and negatives noted in the HPI.    Objective:     /76   Pulse (!) 130   Temp 98.4 °F (36.9 °C) (Oral)   Resp 24   Ht 5' 5\" (1.651 m)   Wt 155 lb (70.3 kg)   LMP 03/07/2011   SpO2 100%   BMI 25.79 kg/m²      General:  Alert, NAD, appears stated age, no accessory m use, ER room malodorous of melena   Head:  Normocephalic, without obvious abnormality, atraumatic.   Eyes:  Sclera anicteric,  EOMs intact. Lids wnl.    Ears, nose, throat:  external ears and nose within normal limits, hearing intact       Neck: Supple, symmetrical   Lungs:   Clear to auscultation bilaterally. ok effort   Chest wall:  No tenderness or deformity.   Heart:  Regular rate and rhythm, S1, S2 normal,no LE edema   Abdomen:   Soft, non-tender. Bowel sounds  normal. . Non distended, no peritoneal signs   Extremities: Extremities normal, atraumatic, no edema.   Skin: Skin color, texture, turgor normal. No visible rashes     Neurologic:  Psychiatric: No facial droop or dysarthria  appropriate affect,  answering questions ok       Results:         CBC/Chem  Recent Labs   Lab 07/06/24  1048   WBC 15.3*   HGB 10.7*   MCV 91.6   .0       Recent Labs   Lab 07/06/24  1048   *   K 3.5   CL 98   CO2 22.0   BUN 25*   CREATSERUM 0.82   *   CA 9.7       Recent Labs   Lab 07/06/24  1048   ALT 47   AST 29   ALB 3.6          COVID-19  Lab Results   Component Value Date    COVID19 Not Detected 08/17/2022    COVID19 NOT DETECTED 12/18/2021          Assessment & Plan:     64 year old female with mmp including but not limited to HL, PUD 2022, alcohol use disorder, chronic active smoker, is admitted for coffee ground emesis, melena that began today.     **coffee ground emesis, melena concerning for   **upper GI bleed in setting of  **Hx \"stomach ulcers\" 2022  **chronic anemia  -GI consult, PPI BID  -serial Hb, orthostats ordered as pt tachycardic (but could be d/t possible alcohol withdrawal? See below)  -baseline Hb appears to be 11s    **alcohol use disorder  -per discussion with ED doc, pt reported 2-3 beers a night. Did not delve into this as had a lot of family members in room, including children  -per chart review, PCP note from 2/2024 reports 6 beers a night  -ethyl alcohol unremarkable  -ciwa for now  -MVI, folic acid, thiamine    **chronic active smoker-encourage cessation, monitor    **chronic hyponatremia- monitor    **HL- home med when able    PPx-scds         Thank You,  Leanne Lacey MD    [facility]  [facility]  Answering Service number: [phone]    7/6/2024    Outpatient records or previous hospital records reviewed.     Further recommendations pending patient's clinical course.  [facility] hospitalist to continue to  follow patient while in house    Patient and/or patient's family given opportunity to ask questions and note understanding and agreeing with therapeutic plan as outlined    Supplementary Documentation:

## 2024-07-06 NOTE — ED INITIAL ASSESSMENT (HPI)
Patient arrives to the ED via [facility] from [facility] after patient exp episodes of bloody vomitus. Patient also fell to the ground d/t her left leg/knee giving out. Patient has no complaints from the fall, no pain. Patient has been exp dark stools since this morning. Patient states she had bloody vomitus at [facility], with some dark blood, and clots. Patient arrives with soiled shirt and pants covered in vomit. Patient A&Ox4/4, skin pink, warm, and dry. RR even and non-labored. Patient is tachy in the 130s. Patient states she has never been to [facility] before.

## 2024-07-07 ENCOUNTER — ANESTHESIA (OUTPATIENT)
Dept: ENDOSCOPY | Facility: HOSPITAL | Age: 64
End: 2024-07-07
Payer: COMMERCIAL

## 2024-07-07 ENCOUNTER — ANESTHESIA EVENT (OUTPATIENT)
Dept: ENDOSCOPY | Facility: HOSPITAL | Age: 64
End: 2024-07-07
Payer: COMMERCIAL

## 2024-07-07 VITALS
BODY MASS INDEX: 25.83 KG/M2 | HEART RATE: 88 BPM | OXYGEN SATURATION: 100 % | HEIGHT: 65 IN | SYSTOLIC BLOOD PRESSURE: 100 MMHG | DIASTOLIC BLOOD PRESSURE: 62 MMHG | TEMPERATURE: 98 F | WEIGHT: 155 LBS | RESPIRATION RATE: 18 BRPM

## 2024-07-07 LAB
ALBUMIN SERPL-MCNC: 3.4 G/DL (ref 3.4–5)
ALBUMIN/GLOB SERPL: 1.1 {RATIO} (ref 1–2)
ALP LIVER SERPL-CCNC: 88 U/L
ALT SERPL-CCNC: 36 U/L
ANION GAP SERPL CALC-SCNC: 9 MMOL/L (ref 0–18)
AST SERPL-CCNC: 24 U/L (ref 15–37)
BASOPHILS # BLD AUTO: 0.06 X10(3) UL (ref 0–0.2)
BASOPHILS NFR BLD AUTO: 0.6 %
BILIRUB SERPL-MCNC: 0.5 MG/DL (ref 0.1–2)
BUN BLD-MCNC: 13 MG/DL (ref 9–23)
CALCIUM BLD-MCNC: 8.5 MG/DL (ref 8.5–10.1)
CHLORIDE SERPL-SCNC: 103 MMOL/L (ref 98–112)
CO2 SERPL-SCNC: 22 MMOL/L (ref 21–32)
CREAT BLD-MCNC: 0.73 MG/DL
EGFRCR SERPLBLD CKD-EPI 2021: 93 ML/MIN/1.73M2 (ref 60–?)
EOSINOPHIL # BLD AUTO: 0.11 X10(3) UL (ref 0–0.7)
EOSINOPHIL NFR BLD AUTO: 1.1 %
ERYTHROCYTE [DISTWIDTH] IN BLOOD BY AUTOMATED COUNT: 13.1 %
GLOBULIN PLAS-MCNC: 3.1 G/DL (ref 2.8–4.4)
GLUCOSE BLD-MCNC: 98 MG/DL (ref 70–99)
HCT VFR BLD AUTO: 23.6 %
HGB BLD-MCNC: 7.7 G/DL
HGB BLD-MCNC: 8.7 G/DL
IMM GRANULOCYTES # BLD AUTO: 0.05 X10(3) UL (ref 0–1)
IMM GRANULOCYTES NFR BLD: 0.5 %
LYMPHOCYTES # BLD AUTO: 2.23 X10(3) UL (ref 1–4)
LYMPHOCYTES NFR BLD AUTO: 23 %
MAGNESIUM SERPL-MCNC: 1.9 MG/DL (ref 1.6–2.6)
MCH RBC QN AUTO: 33.6 PG (ref 26–34)
MCHC RBC AUTO-ENTMCNC: 36.9 G/DL (ref 31–37)
MCV RBC AUTO: 91.1 FL
MONOCYTES # BLD AUTO: 0.67 X10(3) UL (ref 0.1–1)
MONOCYTES NFR BLD AUTO: 6.9 %
NEUTROPHILS # BLD AUTO: 6.58 X10 (3) UL (ref 1.5–7.7)
NEUTROPHILS # BLD AUTO: 6.58 X10(3) UL (ref 1.5–7.7)
NEUTROPHILS NFR BLD AUTO: 67.9 %
OSMOLALITY SERPL CALC.SUM OF ELEC: 278 MOSM/KG (ref 275–295)
PLATELET # BLD AUTO: 230 10(3)UL (ref 150–450)
POTASSIUM SERPL-SCNC: 4 MMOL/L (ref 3.5–5.1)
PROT SERPL-MCNC: 6.5 G/DL (ref 6.4–8.2)
RBC # BLD AUTO: 2.59 X10(6)UL
SODIUM SERPL-SCNC: 134 MMOL/L (ref 136–145)
WBC # BLD AUTO: 9.7 X10(3) UL (ref 4–11)

## 2024-07-07 PROCEDURE — 85018 HEMOGLOBIN: CPT | Performed by: HOSPITALIST

## 2024-07-07 PROCEDURE — 80053 COMPREHEN METABOLIC PANEL: CPT | Performed by: HOSPITALIST

## 2024-07-07 PROCEDURE — 88305 TISSUE EXAM BY PATHOLOGIST: CPT | Performed by: INTERNAL MEDICINE

## 2024-07-07 PROCEDURE — 85025 COMPLETE CBC W/AUTO DIFF WBC: CPT | Performed by: HOSPITALIST

## 2024-07-07 PROCEDURE — 83735 ASSAY OF MAGNESIUM: CPT | Performed by: HOSPITALIST

## 2024-07-07 PROCEDURE — 0DB68ZX EXCISION OF STOMACH, VIA NATURAL OR ARTIFICIAL OPENING ENDOSCOPIC, DIAGNOSTIC: ICD-10-PCS | Performed by: INTERNAL MEDICINE

## 2024-07-07 RX ORDER — LIDOCAINE HYDROCHLORIDE 10 MG/ML
INJECTION, SOLUTION EPIDURAL; INFILTRATION; INTRACAUDAL; PERINEURAL AS NEEDED
Status: DISCONTINUED | OUTPATIENT
Start: 2024-07-07 | End: 2024-07-07 | Stop reason: SURG

## 2024-07-07 RX ORDER — PANTOPRAZOLE SODIUM 40 MG/1
40 TABLET, DELAYED RELEASE ORAL
Qty: 120 TABLET | Refills: 0 | Status: SHIPPED | OUTPATIENT
Start: 2024-07-07 | End: 2024-10-05

## 2024-07-07 RX ORDER — SODIUM CHLORIDE 9 MG/ML
INJECTION, SOLUTION INTRAVENOUS CONTINUOUS
Status: DISCONTINUED | OUTPATIENT
Start: 2024-07-07 | End: 2024-07-07

## 2024-07-07 RX ORDER — MIDAZOLAM HYDROCHLORIDE 1 MG/ML
INJECTION INTRAMUSCULAR; INTRAVENOUS AS NEEDED
Status: DISCONTINUED | OUTPATIENT
Start: 2024-07-07 | End: 2024-07-07 | Stop reason: SURG

## 2024-07-07 RX ORDER — NALOXONE HYDROCHLORIDE 0.4 MG/ML
80 INJECTION, SOLUTION INTRAMUSCULAR; INTRAVENOUS; SUBCUTANEOUS AS NEEDED
OUTPATIENT
Start: 2024-07-07 | End: 2024-07-07

## 2024-07-07 RX ORDER — SODIUM CHLORIDE, SODIUM LACTATE, POTASSIUM CHLORIDE, CALCIUM CHLORIDE 600; 310; 30; 20 MG/100ML; MG/100ML; MG/100ML; MG/100ML
INJECTION, SOLUTION INTRAVENOUS CONTINUOUS PRN
Status: DISCONTINUED | OUTPATIENT
Start: 2024-07-07 | End: 2024-07-07 | Stop reason: SURG

## 2024-07-07 RX ORDER — SODIUM CHLORIDE, SODIUM LACTATE, POTASSIUM CHLORIDE, CALCIUM CHLORIDE 600; 310; 30; 20 MG/100ML; MG/100ML; MG/100ML; MG/100ML
INJECTION, SOLUTION INTRAVENOUS CONTINUOUS
OUTPATIENT
Start: 2024-07-07

## 2024-07-07 RX ADMIN — SODIUM CHLORIDE, SODIUM LACTATE, POTASSIUM CHLORIDE, CALCIUM CHLORIDE: 600; 310; 30; 20 INJECTION, SOLUTION INTRAVENOUS at 13:06:00

## 2024-07-07 RX ADMIN — MIDAZOLAM HYDROCHLORIDE 2 MG: 1 INJECTION INTRAMUSCULAR; INTRAVENOUS at 13:12:00

## 2024-07-07 RX ADMIN — LIDOCAINE HYDROCHLORIDE 50 MG: 10 INJECTION, SOLUTION EPIDURAL; INFILTRATION; INTRACAUDAL; PERINEURAL at 13:09:00

## 2024-07-07 NOTE — PROGRESS NOTES
Latest [facility] VS   07/07/24 0400 07/07/24 0407 07/07/24 0408   Vitals   Temp 98.4 °F (36.9 °C)  --   --    Temp src Oral  --   --    Pulse 103 110 (!) 129   Heart Rate Source Monitor  --   --    Resp 18  --   --    BP (!) 80/64 (!) 84/65 93/77   MAP (mmHg) 71 73 85   BP Location Right arm Right arm Right arm   BP Method Automatic Automatic Automatic   Patient Position Lying Sitting Standing   Oxygen Therapy   SpO2 99 % 100 % 100 %   O2 Device None (Room air)  --   --    Pulse Oximetry Type Continuous  --   --    Oximetry Probe Site Changed No  --   --    Pulse Ox Probe Location Right hand  --   --

## 2024-07-07 NOTE — CONSULTS
[facility] IP Report of Consultation Gastroenterology    2024    Soila Chaves  female   Isaac Rachel MD     [id number]  1960 Primary Care Physician  Mitra Bernal MD     Reason for Consultation: gi bleed, anemia    HPI    65 yo F with history of gastric ulcers  and alcohol use disorder here with black stool and coffee ground emesis.     Noted black stool starting yesterday. Coffee ground emesis last yesterday morning. Not on oral iron. Not taking nsaids aside from baby aspirin. Not on blood thinner. 4-5 beers per day.    Hgb 12 in 2023. Presented with hgb 10.7->9.8->8.6->8.7. Stabilizing.     PROBLEM LIST:     Patient Active Problem List   Diagnosis    Plantar fasciitis    Tobacco use    Pure hypercholesterolemia    Family history of breast cancer    Lumbar radiculopathy    Preop testing    Upper GI bleed       PATIENT HISTORY:     Past Medical History:    Colon polyps    due  ????    High cholesterol    primary care - atorvastatin    History of stomach ulcers    Osteoarthritis    Osteoporosis    Plantar fasciitis    resolved with PT    Smoker    Visual impairment    readers      Past Surgical History:   Procedure Laterality Date      1993    Colonoscopy  14 - AIMEE Campbell    adenoma, diverticuli, hemorrhoids, repeat 2019 w/split prep.     Colonoscopy  2014    repeat 2019    Colonoscopy N/A 2019    Procedure: COLONOSCOPY, POSSIBLE BIOPSY, POSSIBLE POLYPECTOMY 21229;  Surgeon: Lalitha Odonnell MD;  Location: [facility] [place] [facility]    Colonoscopy,jeannine conde,snare  2011    [facility]. 5 large adenomas    Other surgical history      lipoma, left side    Spinal fusion  2022: Posterior instrumentation fusion Thoracic 10, Thoracic 11, Thoracic 12, Lumbar 1, Lumbar 2, Lumbar 3, Lumbar 4, Lumbar 5, to pelvis, extreme lateral interbody fusion lumbar 1-lumbar 2-lumbar 3-lumbar 4, transforaminal lumbar interbody fusion lumbar 4-lumbar 5, lumbar  5-sacrum 1 - Zoey & Jose Armando      Family History   Problem Relation Age of Onset    Cancer Father         lung    Breast Cancer Mother 78        78    Breast Cancer Sister 59        59    Breast Cancer Maternal Aunt 60        60    Heart Disorder Neg     Diabetes Neg       Social History     Socioeconomic History    Marital status:    Tobacco Use    Smoking status: Former     Current packs/day: 0.00     Average packs/day: 0.5 packs/day for 18.0 years (9.0 ttl pk-yrs)     Types: Cigarettes     Start date: 2004     Quit date: 2022     Years since quittin.9    Smokeless tobacco: Never   Vaping Use    Vaping status: Never Used   Substance and Sexual Activity    Alcohol use: Yes     Alcohol/week: 0.0 standard drinks of alcohol     Comment: 1 beer per day, CAGE - negative    Drug use: No    Sexual activity: Not Currently     Social Determinants of Health     Food Insecurity: No Food Insecurity (2024)    Food Insecurity     Food Insecurity: Never true   Transportation Needs: No Transportation Needs (2024)    Transportation Needs     Lack of Transportation: No   Housing Stability: Low Risk  (2024)    Housing Stability     Housing Instability: No        Allergies;  Allergies   Allergen Reactions    Oxycodone NAUSEA ONLY    Oxycodone-Aspirin NAUSEA ONLY        Medications:    Current Facility-Administered Medications:     sodium chloride 0.9% infusion, , Intravenous, Continuous    ondansetron (Zofran) 4 MG/2ML injection 4 mg, 4 mg, Intravenous, Q6H PRN    prochlorperazine (Compazine) 10 MG/2ML injection 5 mg, 5 mg, Intravenous, Q8H PRN    polyethylene glycol (PEG 3350) (Miralax) 17 g oral packet 17 g, 17 g, Oral, Daily PRN    sennosides (Senokot) tab 17.2 mg, 17.2 mg, Oral, Nightly PRN    thiamine (Vitamin B1) tab 100 mg, 100 mg, Oral, Daily    multivitamin with minerals (Thera M Plus) tab 1 tablet, 1 tablet, Oral, Daily    folic acid (Folvite) tab 1 mg, 1 mg, Oral, Daily    LORazepam  (Ativan) tab 1 mg, 1 mg, Oral, Q1H PRN **OR** LORazepam (Ativan) tab 2 mg, 2 mg, Oral, Q1H PRN    pantoprazole (Protonix) 40 mg in sodium chloride 0.9% PF 10 mL IV push, 40 mg, Intravenous, Q12H     REVIEW OF SYSTEMS:   10 point ros reviewed. Pertinent positive per HPI.      EXAM:   BP 98/66 (BP Location: Right arm)   Pulse 101   Temp 98.6 °F (37 °C) (Oral)   Resp 17   Ht 5' 5\" (1.651 m)   Wt 155 lb (70.3 kg)   LMP 03/07/2011   SpO2 100%   BMI 25.79 kg/m²  Body mass index is 25.79 kg/m².  GENERAL: Well developed, well nourished, in no obvious distress.   CV: RRR  PULM: CTAB  ABDOMEN: Bowel sounds normoactive. Soft, no organomegaly or masses appreciated. Nontender.   EXTREMITIES: Without cyanosis. No peripheral edema appreciated.   NEURO: Alert         LAB/IMAGING RESULTS:     Lab Results   Component Value Date    WBC 9.7 07/07/2024    HGB 8.7 07/07/2024    HCT 23.6 07/07/2024    .0 07/07/2024     [unfilled]  Lab Results   Component Value Date    WBC 9.7 07/07/2024    HGB 8.7 07/07/2024    HCT 23.6 07/07/2024    .0 07/07/2024    CREATSERUM 0.73 07/07/2024    BUN 13 07/07/2024     07/07/2024    K 4.0 07/07/2024     07/07/2024    CO2 22.0 07/07/2024    GLU 98 07/07/2024    CA 8.5 07/07/2024    ALB 3.4 07/07/2024    ALKPHO 88 07/07/2024    BILT 0.5 07/07/2024    TP 6.5 07/07/2024    AST 24 07/07/2024    ALT 36 07/07/2024    MG 1.9 07/07/2024 2022  Procedure:   After the patient was interviewed and the procedure again discussed and questions addressed, the patient was brought to the GI Lab and monitoring of the B/P, pulse, and pulse oximetry was performed. The patient was then placed in the left lateral decubitus position and sedated with divided doses of IV medication; continuous vital signs were monitored throughout the procedure.     The Olympus videogastroscope was intubated into the esophagus under directed vision without difficulty. The esophageal mucosa was examined  throughout its length and was unremarkable.  The Z kristin was irregular.     The gastric lumen was then entered and views of the gastric mucosa as well as retroverted views of the fundus revealed a small amount of heme material throughout the stomach.  In the antrum were numerous (>5)  1 cm ulcer craters without active bleeding or stigmata of recent bleeding.  Biopsies were taken to rule out an associated H.pylori.     A normal pylorus was passed and the duodenal bulb entered, the duodenal bulb and post-bulbar duodenum were unremarkable.     Upon withdrawl of the endoscope re-examination of the mucosal surface showed no other abnormalities, the residual air was aspirated and the endoscope was then withdrawn. The procedure was well tolerated and upon completion and throughtout the patient has stable vitals signs.     The patient was informed of the endoscopic findings and was also given a copy of the findings, postoperative instructions, and postoperative precautions.     IMPRESSION:  Multiple Gastric Ulcers    ASSESSMENT AND PLAN:   Anemia  Melena    65 yo F with history of gastric ulcers 2022 and alcohol use disorder here with black stool and coffee ground emesis. On [facility] protocol for withdrawal. Hgb 12 in November 2023. Presented with hgb 10.7->9.8->8.6->8.7. Stabilizing.     - npo for egd today  - pantoprazole 40 mg bid     The patient indicates understanding of these issues and agrees to the plan.  Isaac Rachle MD  7/7/2024  11:51 AM

## 2024-07-07 NOTE — PLAN OF CARE
patient given order to discharge home. This RN discussed with patient at bedside the following discharge instructions including: follow up care, new Rxs for patient to fill at own pharmacy, home medications to continue, home care, and s/s of when to return to the ED/call 911- patient verbalized understanding of all instruction. Patient's IV and cardiac monitor were removed from the patient. Patient was transported in stable condition to private vehicle at [facility] via wheelchair.

## 2024-07-07 NOTE — PROGRESS NOTES
[facility] Progress Note     Soila Chaves Patient Status:  Inpatient    1960 MRN [id number]   [facility] 3NE-A Attending Leanne Lacey, *   Hosp Day # 1 PCP Mitra Bernal MD     Chief Complaint: f/u melena, coffee ground emesis    Subjective:   S:  had melena overnight- black, tarry. No LH/dizziness currently. No abdominal pain. Son at bedside    Review of Systems:   10 point ROS completed and was negative, except for pertinent positive and negatives stated in subjective.    Objective:   Vital signs:  Temp:  [98 °F (36.7 °C)-98.6 °F (37 °C)] 98.6 °F (37 °C)  Pulse:  [] 101  Resp:  [16-26] 17  BP: ()/(55-77) 98/66  SpO2:  [92 %-100 %] 100 %    Wt Readings from Last 6 Encounters:   24 155 lb (70.3 kg)   22 162 lb 11.2 oz (73.8 kg)   22 155 lb 9.6 oz (70.6 kg)   21 155 lb (70.3 kg)   10/06/21 155 lb (70.3 kg)   21 158 lb (71.7 kg)         Physical Exam:    Gen: No acute distress, alert and oriented , no accessory m use  Pulm: Lungs clear, ok respiratory effort  CV: Heart with regular rate and rhythm, no edema  Abd: Abdomen soft, nontender, nondistended, bowel sounds present, no peritoneal signs  MSK:   no clubbing, no cyanosis  Skin: no visible rashes    Psych:   appropriate affect,   answering questions okay.    Results:   Diagnostic Data:      Labs:       Recent Labs   Lab 24  1048 24  1452 24  2302 24  0805   WBC 15.3*  --   --  9.7   HGB 10.7* 9.8* 8.6* 8.7*   MCV 91.6  --   --  91.1   .0  --   --  230.0       Recent Labs   Lab 24  1048 24  0805   * 134*   K 3.5 4.0   CL 98 103   CO2 22.0 22.0   BUN 25* 13   CREATSERUM 0.82 0.73   CA 9.7 8.5   MG  --  1.9   * 98       Recent Labs   Lab 24  1048 24  0805   ALT 47 36   AST 29 24   ALB 3.6 3.4            COVID-19  Lab Results   Component Value Date    COVID19 Not Detected 2022    COVID19 NOT  DETECTED 12/18/2021          Medications:    thiamine  100 mg Oral Daily    multivitamin with minerals  1 tablet Oral Daily    folic acid  1 mg Oral Daily    pantoprazole  40 mg Intravenous Q12H      sodium chloride 100 mL/hr at 07/07/24 0513       ondansetron    prochlorperazine    polyethylene glycol (PEG 3350)    sennosides    LORazepam **OR** LORazepam          ASSESSMENT / PLAN:   64 year old female with mmp including but not limited to HL, PUD 2022, alcohol use disorder, chronic active smoker, is admitted for coffee ground emesis, melena that began today.      **coffee ground emesis, melena concerning for   **upper GI bleed in setting of  **Hx \"stomach ulcers\" 2022  **chronic anemia  -GI consult, PPI BID  -serial Hb downtrending, orthostats neg  but mildly hypotensive  -baseline Hb appears to be 11s  -npo, IVF for now in case of procedure     **alcohol use disorder  -per discussion with ED doc, pt reported 2-3 beers a night. Did not delve into this as had a lot of family members in room, including children  -per chart review, PCP note from 2/2024 reports 6 beers a night  -ethyl alcohol unremarkable  -ciwa for now  -MVI, folic acid, thiamine     **chronic active smoker-encourage cessation, monitor     **chronic hyponatremia- monitor     **HL- home med when able     PPx-scds     Dw pt, son, RN      Thank You,  Leanne Lacey MD    [facility] Hospitalist  Internal Medicine  Answering Service number: [phone]    Supplementary Documentation:

## 2024-07-07 NOTE — ANESTHESIA POSTPROCEDURE EVALUATION
[facility]    Soila Chaves Patient Status:  Inpatient   Age/Gender 64 year old female MRN [id number]   Location [facility] ENDOSCOPY PAIN CENTER Attending Leanne Lacey, *   Hosp Day # 1 PCP Mitra Bernal MD       Anesthesia Post-op Note    ESOPHAGOGASTRODUODENOSCOPY (EGD) WITH BIOPSIES    Procedure Summary       Date: 07/07/24 Room / Location:  ENDOSCOPY 03 / [facility] END[facility]    Anesthesia Start: 1306 Anesthesia Stop: 1317    Procedure: ESOPHAGOGASTRODUODENOSCOPY (EGD) WITH BIOPSIES Diagnosis: (HIATAL HERNIA, ESOPHAGITIS, GASTRIC ULCER)    Surgeons: Isaac Rachel MD Responsible Provider: Jaya Li MD    Anesthesia Type: MAC ASA Status: 2            Anesthesia Type: MAC    Vitals Value Taken Time   BP 81/43 07/07/24 1319   Temp  07/07/24 1320   Pulse 111 07/07/24 1319   Resp 15 07/07/24 1319   SpO2 95 % 07/07/24 1319       Patient Location: Endoscopy    Anesthesia Type: MAC    Airway Patency: extubated and patent    Postop Pain Control: adequate    Mental Status: preanesthetic baseline    Nausea/Vomiting: none    Cardiopulmonary/Hydration status: stable euvolemic    Complications: no apparent anesthesia related complications    Postop vital signs: stable    Dental Exam: Unchanged from Preop    Patient to be discharged from PACU when criteria met.

## 2024-07-07 NOTE — ANESTHESIA PREPROCEDURE EVALUATION
PRE-OP EVALUATION    Patient Name: Soila Chaves    Admit Diagnosis: Upper GI bleed [K92.2]    Pre-op Diagnosis: GI BLEED    ESOPHAGOGASTRODUODENOSCOPY (EGD)    Anesthesia Procedure: ESOPHAGOGASTRODUODENOSCOPY (EGD)    Surgeons and Role:     * Isaac Rachel MD - Primary    Pre-op vitals reviewed.  Temp: 98.6 °F (37 °C)  Pulse: 101  Resp: 17  BP: 98/66  SpO2: 100 %  Body mass index is 25.79 kg/m².    Current medications reviewed.  Hospital Medications:   sodium chloride 0.9% infusion   Intravenous Continuous    [COMPLETED] pantoprazole (Protonix) 40 mg in sodium chloride 0.9% PF 10 mL IV push  40 mg Intravenous Once    [COMPLETED] sodium chloride 0.9 % IV bolus 1,000 mL  1,000 mL Intravenous Once    [] dextrose 5%-sodium chloride 0.45% infusion   Intravenous Continuous    ondansetron (Zofran) 4 MG/2ML injection 4 mg  4 mg Intravenous Q6H PRN    prochlorperazine (Compazine) 10 MG/2ML injection 5 mg  5 mg Intravenous Q8H PRN    polyethylene glycol (PEG 3350) (Miralax) 17 g oral packet 17 g  17 g Oral Daily PRN    sennosides (Senokot) tab 17.2 mg  17.2 mg Oral Nightly PRN    [COMPLETED] thiamine 100 mg/mL injection 100 mg  100 mg Intravenous Once    thiamine (Vitamin B1) tab 100 mg  100 mg Oral Daily    multivitamin with minerals (Thera M Plus) tab 1 tablet  1 tablet Oral Daily    folic acid (Folvite) tab 1 mg  1 mg Oral Daily    LORazepam (Ativan) tab 1 mg  1 mg Oral Q1H PRN    Or    LORazepam (Ativan) tab 2 mg  2 mg Oral Q1H PRN    pantoprazole (Protonix) 40 mg in sodium chloride 0.9% PF 10 mL IV push  40 mg Intravenous Q12H       Outpatient Medications:     Medications Prior to Admission   Medication Sig Dispense Refill Last Dose    alendronate 70 MG Oral Tab Take 1 tablet (70 mg total) by mouth once a week.   2024    calcium carbonate 500 MG Oral Chew Tab Chew 2 tablets (1,000 mg total) by mouth daily.       Omega-3 Fatty Acids (FISH OIL) 300 MG Oral Cap Take by mouth.       ATORVASTATIN 10 MG Oral Tab  TAKE ONE TABLET BY MOUTH ONE TIME DAILY (Patient taking differently: 1 tablet (10 mg total) nightly.) 90 tablet 0 2024    aspirin 81 MG Oral Tab Take 1 tablet (81 mg total) by mouth daily.   2024    Cholecalciferol (VITAMIN D) 1000 UNITS Oral Tab Take  by mouth.   2024       Allergies: Oxycodone and Oxycodone-aspirin      Anesthesia Evaluation    Patient summary reviewed.    Anesthetic Complications  (-) history of anesthetic complications         GI/Hepatic/Renal           (+) PUD                      Cardiovascular                     (+) hyperlipidemia                                  Endo/Other                           (+) arthritis       Pulmonary    Negative pulmonary ROS.                       Neuro/Psych                              + tobacco use  6 EtOH/day        Past Surgical History:   Procedure Laterality Date          Colonoscopy  14 - AIMEE Campbell    adenoma, diverticuli, hemorrhoids, repeat  w/split prep.     Colonoscopy  ,     repeat 2019    Colonoscopy N/A 2019    Procedure: COLONOSCOPY, POSSIBLE BIOPSY, POSSIBLE POLYPECTOMY 38844;  Surgeon: Lalitha Odonnell MD;  Location: [facility] [place] [facility], [facility]    Colonoscopy,remv lesn,snare  2011    [facility]. 5 large adenomas    Other surgical history      lipoma, left side    Spinal fusion  2022: Posterior instrumentation fusion Thoracic 10, Thoracic 11, Thoracic 12, Lumbar 1, Lumbar 2, Lumbar 3, Lumbar 4, Lumbar 5, to pelvis, extreme lateral interbody fusion lumbar 1-lumbar 2-lumbar 3-lumbar 4, transforaminal lumbar interbody fusion lumbar 4-lumbar 5, lumbar 5-sacrum 1 - Zoey & Jose Armando     Social History     Socioeconomic History    Marital status:    Tobacco Use    Smoking status: Former     Current packs/day: 0.00     Average packs/day: 0.5 packs/day for 18.0 years (9.0 ttl pk-yrs)     Types: Cigarettes     Start date: 2004     Quit date: 2022     Years since quitting:  1.9    Smokeless tobacco: Never   Vaping Use    Vaping status: Never Used   Substance and Sexual Activity    Alcohol use: Yes     Alcohol/week: 0.0 standard drinks of alcohol     Comment: 1 beer per day, CAGE - negative    Drug use: No    Sexual activity: Not Currently     History   Drug Use No     Available pre-op labs reviewed.  Lab Results   Component Value Date    WBC 9.7 07/07/2024    RBC 2.59 (L) 07/07/2024    HGB 8.7 (L) 07/07/2024    HCT 23.6 (L) 07/07/2024    MCV 91.1 07/07/2024    MCH 33.6 07/07/2024    MCHC 36.9 07/07/2024    RDW 13.1 07/07/2024    .0 07/07/2024     Lab Results   Component Value Date     (L) 07/07/2024    K 4.0 07/07/2024     07/07/2024    CO2 22.0 07/07/2024    BUN 13 07/07/2024    CREATSERUM 0.73 07/07/2024    GLU 98 07/07/2024    CA 8.5 07/07/2024            Airway      Mallampati: II  Mouth opening: >3 FB  TM distance: > 6 cm  Neck ROM: full Cardiovascular      Rhythm: regular  Rate: normal  (+) murmur (3/6 TISHA loudest over RUSB)   Dental  Comment: No obvious evidence of dental disease           Pulmonary      Breath sounds clear to auscultation bilaterally.               Other findings              ASA: 2   Plan: MAC  NPO status verified and patient meets guidelines.        Comment: Pt advised of heart murmur and encouraged to pursue w/u with PCP.  Pt counseled on smoking cessation and urged to pursue assistance with her alcohol intake.  All anesthetic related questions were addressed.  Pt expressed understanding of and agreement with the anesthetic plan.      Plan/risks discussed with: patient and child/children                Present on Admission:  **None**

## 2024-07-07 NOTE — OPERATIVE REPORT
EGD Operative Report    Soila Chaves Patient Status:  Inpatient    1960 MRN [id number]   [facility] ENDOSCOPY [facility] Attending Leanne Lacey, *   Hosp Day #   1 PCP Mitra Bernal MD       Pre-op Diagnosis: GI BLEED     Post-Op Diagnosis:    ESOPHAGUS:  non obstructive schatzki's ring with non bleeding tear, la grade B esophagitis   STOMACH:  2 cm hiatal hernia, one 6 mm clean based antral ulcer, biopsied for h pylori   DUODENUM:  normal    Procedure Performed: Procedure(s):  ESOPHAGOGASTRODUODENOSCOPY (EGD) with biopsies    Informed Consent: Informed consent for both the procedure and sedation were obtained from the patient. The potentially life-threatening complications of sedation, bleeding,  Perforation, transfusion or repeat endoscopy were reviewed along with the possible need for hospitalization, surgical management, transfusion or repeat endoscopy should one of these complications arise. The patient understands and is agreeable to proceed.  Sedation Type: MAC-Patient received sedation with monitored anesthesia provided by an anesthesiologist      Procedure Description: The patient was placed in the left lateral decubitus position.  A bite block was placed in the patient’s mouth.  The endoscope was inserted through the mouth and advanced under direct visualization to the descending duodenum and was then withdrawn to examine the duodenal bulb and gastric antrum.  The endoscope was then retroflexed to examine the angulus, GE junction, cardia, body and fundus and then withdrawn to examine the esophagus. The endoscope was then removed from the patient. The patient tolerated the procedure well with no immediate complications and was transferred to the recovery area in stable condition.  Findings:    ESOPHAGUS:  non obstructive  schatzki's ring with non bleeding tear, la grade B esophagitis   STOMACH:  2 cm hiatal hernia, one 6 mm clean based antral ulcer, biopsied for h pylori   DUODENUM:  normal  Recommendations: discharge home on regular diet, pantoprazole 40 mg bid ac for two months then daily, egd in two months, ok for baby aspirin  Discharge:  The patient was given an after visit summary detailing the procedure, findings, recommendations and follow up plans.  Isaac Rachel MD  7/7/2024  1:02 PM

## 2024-07-07 NOTE — H&P
[facility]   part of [facility]    History & Physical    Soila Chaves Patient Status:  Inpatient    1960 MRN [id number]   Location [facility]OS[facility] Attending Leanne Lacey, *   Hosp Day # 1 PCP Mitra Bernal MD     Date:  2024  Date of Admission:  2024    History provided by:patient  Chief Complaint:     Chief Complaint   Patient presents with    GI Bleeding       HPI:   Soila Chaves is a(n) 64 year old female. Here for melena, anemia    History     Past Medical History:    Colon polyps    due  ????    High cholesterol    primary care - atorvastatin    History of stomach ulcers    Osteoarthritis    Osteoporosis    Plantar fasciitis    resolved with PT    Smoker    Visual impairment    readers     Past Surgical History:   Procedure Laterality Date          Colonoscopy  14 - AIMEE Campbell    adenoma, diverticuli, hemorrhoids, repeat  w/split prep.     Colonoscopy  ,     repeat 2019    Colonoscopy N/A 2019    Procedure: COLONOSCOPY, POSSIBLE BIOPSY, POSSIBLE POLYPECTOMY 35852;  Surgeon: Lalitha Odonnell MD;  Location: [facility], [facility]    Colonoscopy,remv lesn,snare  2011    [facility]. 5 large adenomas    Other surgical history      lipoma, left side    Spinal fusion  2022: Posterior instrumentation fusion Thoracic 10, Thoracic 11, Thoracic 12, Lumbar 1, Lumbar 2, Lumbar 3, Lumbar 4, Lumbar 5, to pelvis, extreme lateral interbody fusion lumbar 1-lumbar 2-lumbar 3-lumbar 4, transforaminal lumbar interbody fusion lumbar 4-lumbar 5, lumbar 5-sacrum 1 - Zoey & Jose Armando     Family History   Problem Relation Age of Onset    Cancer Father         lung    Breast Cancer Mother 78        78    Breast Cancer Sister 59        59    Breast Cancer Maternal Aunt 60        60    Heart Disorder Neg     Diabetes Neg      Social History:  Social History     Socioeconomic History    Marital status:     Tobacco Use    Smoking status: Former     Current packs/day: 0.00     Average packs/day: 0.5 packs/day for 18.0 years (9.0 ttl pk-yrs)     Types: Cigarettes     Start date: 2004     Quit date: 2022     Years since quittin.9    Smokeless tobacco: Never   Vaping Use    Vaping status: Never Used   Substance and Sexual Activity    Alcohol use: Yes     Alcohol/week: 0.0 standard drinks of alcohol     Comment: 1 beer per day, CAGE - negative    Drug use: No    Sexual activity: Not Currently     Social Determinants of Health     Food Insecurity: No Food Insecurity (2024)    Food Insecurity     Food Insecurity: Never true   Transportation Needs: No Transportation Needs (2024)    Transportation Needs     Lack of Transportation: No   Housing Stability: Low Risk  (2024)    Housing Stability     Housing Instability: No     Allergies/Medications:   Allergies:   Allergies   Allergen Reactions    Oxycodone NAUSEA ONLY    Oxycodone-Aspirin NAUSEA ONLY     Medications Prior to Admission   Medication Sig    alendronate 70 MG Oral Tab Take 1 tablet (70 mg total) by mouth once a week.    calcium carbonate 500 MG Oral Chew Tab Chew 2 tablets (1,000 mg total) by mouth daily.    Omega-3 Fatty Acids (FISH OIL) 300 MG Oral Cap Take by mouth.    ATORVASTATIN 10 MG Oral Tab TAKE ONE TABLET BY MOUTH ONE TIME DAILY (Patient taking differently: 1 tablet (10 mg total) nightly.)    aspirin 81 MG Oral Tab Take 1 tablet (81 mg total) by mouth daily.    Cholecalciferol (VITAMIN D) 1000 UNITS Oral Tab Take  by mouth.       Review of Systems:   Pertinent items are noted in HPI.  A comprehensive review of systems was negative.    Physical Exam:   Vital Signs:  Blood pressure 98/66, pulse 101, temperature 98.6 °F (37 °C), temperature source Oral, resp. rate 17, height 5' 5\" (1.651 m), weight 155 lb (70.3 kg), last menstrual period 2011, SpO2 100%, not currently breastfeeding.     General appearance:  alert, appears  stated age, and cooperative  Head: Normocephalic, without obvious abnormality, atraumatic  Pulmonary: clear to auscultation bilaterally  Cardiovascular: S1, S2 normal, no murmur, click, rub or gallop, regular rate and rhythm  Abdominal: soft, non-tender; bowel sounds normal; no masses,  no organomegaly  Extremities: extremities normal, atraumatic, no cyanosis or edema      Results:     Lab Results   Component Value Date    WBC 9.7 07/07/2024    HGB 8.7 (L) 07/07/2024    HCT 23.6 (L) 07/07/2024    .0 07/07/2024    CREATSERUM 0.73 07/07/2024    BUN 13 07/07/2024     (L) 07/07/2024    K 4.0 07/07/2024     07/07/2024    CO2 22.0 07/07/2024    GLU 98 07/07/2024    CA 8.5 07/07/2024    ALB 3.4 07/07/2024    ALKPHO 88 07/07/2024    BILT 0.5 07/07/2024    TP 6.5 07/07/2024    AST 24 07/07/2024    ALT 36 07/07/2024    PTT 29.2 08/17/2022    INR 1.03 08/17/2022    TSH 1.330 08/17/2022    MG 1.9 07/07/2024    ETOH <3 07/06/2024       XR CHEST AP PORTABLE  (CPT=71045)    Result Date: 7/6/2024  CONCLUSION:    Normal heart size.  No sign of pneumonia, pleural effusion, pneumothorax, hyperinflation, CHF.  Partially seen spine fusion hardware.  LOCATION:  [facility]      Dictated by (CST): Arnav Mckeon MD on 7/06/2024 at 1:20 PM     Finalized by (CST): Arnav Mckeon MD on 7/06/2024 at 1:25 PM      EKG 12 Lead    Result Date: 7/6/2024  Sinus tachycardia Possible Lateral infarct , age undetermined Possible Inferior infarct , age undetermined Abnormal ECG No previous ECGs found in [facility] Confirmed by SELWYN SOLIMAN EVANS ([id number]) on 7/6/2024 5:09:14 PM     egd        Isaac Rachel MD  7/7/2024

## 2024-07-07 NOTE — PLAN OF CARE
A&Ox4  RA, VSS  Ciwa Protocol  Tele on ST  Denies pain, Denies  n/v/d  No-cardiac/elec protocol  Clear Liq  SBA, gen. Weakness  Saline lock  Bed in lowest pos, bed alarm on  Safety and fall prec maintained  POC on-going      Problem: SAFETY ADULT - FALL  Goal: Free from fall injury  Description: INTERVENTIONS:  - Assess pt frequently for physical needs  - Identify cognitive and physical deficits and behaviors that affect risk of falls.  - [place] fall precautions as indicated by assessment.  - Educate pt/family on patient safety including physical limitations  - Instruct pt to call for assistance with activity based on assessment  - Modify environment to reduce risk of injury  - Provide assistive devices as appropriate  - Consider OT/PT consult to assist with strengthening/mobility  - Encourage toileting schedule  Outcome: Progressing     Problem: GASTROINTESTINAL - ADULT  Goal: Minimal or absence of nausea and vomiting  Description: INTERVENTIONS:  - Maintain adequate hydration with IV or PO as ordered and tolerated  - Nasogastric tube to low intermittent suction as ordered  - Evaluate effectiveness of ordered antiemetic medications  - Provide nonpharmacologic comfort measures as appropriate  - Advance diet as tolerated, if ordered  - Obtain nutritional consult as needed  - Evaluate fluid balance  Outcome: Progressing  Goal: Maintains or returns to baseline bowel function  Description: INTERVENTIONS:  - Assess bowel function  - Maintain adequate hydration with IV or PO as ordered and tolerated  - Evaluate effectiveness of GI medications  - Encourage mobilization and activity  - Obtain nutritional consult as needed  - Establish a toileting routine/schedule  - Consider collaborating with pharmacy to review patient's medication profile  Outcome: Progressing     Problem: HEMATOLOGIC - ADULT  Goal: Maintains hematologic stability  Description: INTERVENTIONS  - Assess for signs and symptoms of bleeding or hemorrhage  -  Monitor labs and vital signs for trends  - Administer supportive blood products/factors, fluids and medications as ordered and appropriate  - Administer supportive blood products/factors as ordered and appropriate  Outcome: Progressing     Problem: MUSCULOSKELETAL - ADULT  Goal: Return mobility to safest level of function  Description: INTERVENTIONS:  - Assess patient stability and activity tolerance for standing, transferring and ambulating w/ or w/o assistive devices  - Assist with transfers and ambulation using safe patient handling equipment as needed  - Ensure adequate protection for wounds/incisions during mobilization  - Obtain PT/OT consults as needed  - Advance activity as appropriate  - Communicate ordered activity level and limitations with patient/family  Outcome: Progressing     Problem: NEUROLOGICAL - ADULT  Goal: Absence of seizures  Description: INTERVENTIONS  - Monitor for seizure activity  - Administer anti-seizure medications as ordered  - Monitor neurological status  Outcome: Progressing  Goal: Remains free of injury related to seizure activity  Description: INTERVENTIONS:  - Maintain airway, patient safety  and administer oxygen as ordered  - Monitor patient for seizure activity, document and report duration and description of seizure to MD/LIP  - If seizure occurs, turn patient to side and suction secretions as needed  - Reorient patient post seizure  - Seizure pads on all 4 side rails  - Instruct patient/family to notify RN of any seizure activity  - Instruct patient/family to call for assistance with activity based on assessment  Outcome: Progressing

## 2024-07-08 NOTE — PAYOR COMM NOTE
--------------  ADMISSION REVIEW     Payor: [employer] [facility]/HMO/POS/EPO  Subscriber #:  [id number]  Authorization Number: [id number]    Admit date: 24  Admit time:  2:22 PM       Patient Seen in: [facility] Emergency Department    History     Stated Complaint: GI Bleeding - vomiting blood, dark black stool this morning, fall d/g weakeness*    This is a 64-year-old female past medical history of high cholesterol, gastric ulcers, smoker who presents with hematemesis and black stools this morning.  Patient admits to drinking 3 beers per day.  She is on a baby aspirin daily.  Patient reports that she was in [facility] with her mother when her legs suddenly came out.  She was able to lower herself to the floor.  She denied her head.  There was no LOC.  She states that she vomited coffee-ground emesis twice this morning and then had 2 black bowel movements this morning.  She denies abdominal pain.  She had an EGD with biopsies 2022 which showed multiple antral ulcers according to endoscopy report by Dr. Odonnell.  Patient is not on PPIs.  She smokes half a pack of tobacco daily.  She admits to having a beer this morning.  She denies any symptoms of withdrawal if she stops drinking.  She presents here via ambulance for further evaluation.    Objective:   Past Medical History:    Colon polyps    due  ????    High cholesterol    primary care - atorvastatin    History of stomach ulcers    Osteoarthritis    Osteoporosis    Plantar fasciitis    resolved with PT    Smoker     Past Surgical History:   Procedure Laterality Date          Colonoscopy  14 - AIMEE Campbell    adenoma, diverticuli, hemorrhoids, repeat  w/split prep.     Colonoscopy  2014    repeat 2019    Colonoscopy N/A 2019    Procedure: COLONOSCOPY, POSSIBLE BIOPSY, POSSIBLE POLYPECTOMY 89610;  Surgeon: Lalitha Odonnell MD;  Location: [facility] [place], [facility]    Colonoscopy,jeannine conde,snare  2011    [facility].   large adenomas    Other surgical history  2005    lipoma, left side    Spinal fusion  08/19/2022 8-19-22: Posterior instrumentation fusion Thoracic 10, Thoracic 11, Thoracic 12, Lumbar 1, Lumbar 2, Lumbar 3, Lumbar 4, Lumbar 5, to pelvis, extreme lateral interbody fusion lumbar 1-lumbar 2-lumbar 3-lumbar 4, transforaminal lumbar interbody fusion lumbar 4-lumbar 5, lumbar 5-sacrum 1 - Elboghdady & Suárez     Physical Exam     ED Triage Vitals   BP 07/06/24 1041 106/65   Pulse 07/06/24 1041 (!) 136   Resp 07/06/24 1041 26   Temp 07/06/24 1127 98.4 °F (36.9 °C)   Temp src 07/06/24 1127 Oral   SpO2 07/06/24 1041 98 %   O2 Device 07/06/24 1041 None (Room air)     Current Vitals:   Vital Signs  BP: 120/76  Pulse: (!) 130  Resp: 24  Temp: 98.4 °F (36.9 °C)  Temp src: Oral  MAP (mmHg): 89    Physical Exam    GENERAL: Awake, alert oriented x3, nontoxic appearing.   SKIN: Normal, warm, and dry.  HEENT:  Pupils equally round and reactive to light. Conjuctiva clear.  Oropharynx is clear and moist.   Lungs: Clear to auscultation bilaterally with no rales, no retractions, and no wheezing.  HEART:  Regular rate and rhythm. S1 and S2. No murmurs, no rubs or gallops.   ABDOMEN: Soft, nontender and nondistended. Normoactive bowel sounds. No rebound. No guarding.   Rectal: Heme positive black stool.  Labs Reviewed   COMP METABOLIC PANEL (14) - Abnormal; Notable for the following components:       Result Value    Glucose 124 (*)     Sodium 130 (*)     BUN 25 (*)     All other components within normal limits   CBC W/ DIFFERENTIAL - Abnormal; Notable for the following components:    WBC 15.3 (*)     RBC 3.23 (*)     HGB 10.7 (*)     HCT 29.6 (*)     Neutrophil Absolute Prelim 11.53 (*)     Neutrophil Absolute 11.53 (*)     All other components within normal limits   ETHYL ALCOHOL - Normal   URINALYSIS, ROUTINE   TYPE AND SCREEN       Sinus tachycardia  Reading: No acute changes    XR CHEST AP PORTABLE   Normal heart size.  No  sign of pneumonia, pleural effusion, pneumothorax, hyperinflation, CHF.  Partially seen spine fusion hardware.      This is a 64-year-old female past medical history of high cholesterol, gastric ulcers, smoker who presents with hematemesis and black stools this morning.  Differential includes peptic ulcer disease, gastritis.    Patient placed on cardiac monitor, continuous pulse oximetry and IV line was established of normal saline.  Second peripheral IV line was established.  Patient was kept NPO.  IV Protonix was given.  She was given a 1 L bolus.    Basic labs were obtained.  CBC: White blood cell count 15.3.  Hemoglobin 10.7.  Previous hemoglobin 8.8 on 8/23/2022.  This is around her baseline.  Platelet 322.  CMP: BUN 25.  Count 0.8.  Glucose 124.  Sodium 130.  Bicarb 22.    Patient was typed and screened.    Case discussed with Dr. Leach   No NG tube needed.  Clear liquids.  Continue to monitor.  Protonix every 12.  She will see her tomorrow.    Disposition and Plan     Clinical Impression:  1. Upper GI bleed           History and Physical     Chief Complaint: coffee ground emesis, melena     Soila Chaves is a 64 year old female with mmp including but not limited to HL, PUD 2022, alcohol use disorder, chronic active smoker, is admitted for coffee ground emesis, melena that began today. No abdominal pain. +LH. No cp/sob/f/c/dysuria. Family at bedside--who state they made her come to the hospital, she did not want to go.      /76  Pulse (!) 130  Temp 98.4 °F (36.9 °C) (Oral)  Resp 24  Ht 5' 5\" (1.651 m)  Wt 155 lb (70.3 kg)  LMP 03/07/2011  SpO2 100%  BMI 25.79 kg/m²       Lab 07/06/24  1048   WBC 15.3*   HGB 10.7*   MCV 91.6   .0      *   K 3.5   CL 98   CO2 22.0   BUN 25*   CREATSERUM 0.82   *   CA 9.7      ALT 47   AST 29   ALB 3.6          Assessment & Plan:  64 year old female with mmp including but not limited to HL, PUD 2022, alcohol use disorder, chronic active  smoker, is admitted for coffee ground emesis, melena that began today.      **coffee ground emesis, melena concerning for   **upper GI bleed in setting of  **Hx \"stomach ulcers\" 2022  **chronic anemia  -GI consult, PPI BID  -serial Hb, orthostats ordered as pt tachycardic (but could be d/t possible alcohol withdrawal? See below)  -baseline Hb appears to be 11s     **alcohol use disorder  -per discussion with ED doc, pt reported 2-3 beers a night. Did not delve into this as had a lot of family members in room, including children  -per chart review, PCP note from 2/2024 reports 6 beers a night  -ethyl alcohol unremarkable  -ciwa for now  -MVI, folic acid, thiamine     **chronic active smoker-encourage cessation, monitor     **chronic hyponatremia- monitor     **HL- home med when able     PPx-scds       7/7:    HOSPITALIST:    S:  had melena overnight- black, tarry. No LH/dizziness currently. No abdominal pain. Son at bedside     Temp:  [98 °F (36.7 °C)-98.6 °F (37 °C)] 98.6 °F (37 °C)  Pulse:  [] 101  Resp:  [16-26] 17  BP: ()/(55-77) 98/66  SpO2:  [92 %-100 %] 100 %         ASSESSMENT / PLAN:   64 year old female with mmp including but not limited to HL, PUD 2022, alcohol use disorder, chronic active smoker, is admitted for coffee ground emesis, melena that began today.      **coffee ground emesis, melena concerning for   **upper GI bleed in setting of  **Hx \"stomach ulcers\" 2022  **chronic anemia  -GI consult, PPI BID  -serial Hb downtrending, orthostats neg  but mildly hypotensive  -baseline Hb appears to be 11s  -npo, IVF for now in case of procedure     **alcohol use disorder  -per discussion with ED doc, pt reported 2-3 beers a night. Did not delve into this as had a lot of family members in room, including children  -per chart review, PCP note from 2/2024 reports 6 beers a night  -ethyl alcohol unremarkable  -ciwa for now  -MVI, folic acid, thiamine     **chronic active smoker-encourage cessation,  monitor     **chronic hyponatremia- monitor     **HL- home med when able     PPx-scds        GI:     65 yo F with history of gastric ulcers 2022 and alcohol use disorder here with black stool and coffee ground emesis.      Noted black stool starting yesterday. Coffee ground emesis last yesterday morning. Not on oral iron. Not taking nsaids aside from baby aspirin. Not on blood thinner. 4-5 beers per day.     Hgb 12 in November 2023. Presented with hgb 10.7->9.8->8.6->8.7. Stabilizing.       Current Facility-Administered Medications:     sodium chloride 0.9% infusion, , Intravenous, Continuous    ondansetron (Zofran) 4 MG/2ML injection 4 mg, 4 mg, Intravenous, Q6H PRN    prochlorperazine (Compazine) 10 MG/2ML injection 5 mg, 5 mg, Intravenous, Q8H PRN    polyethylene glycol (PEG 3350) (Miralax) 17 g oral packet 17 g, 17 g, Oral, Daily PRN    sennosides (Senokot) tab 17.2 mg, 17.2 mg, Oral, Nightly PRN    thiamine (Vitamin B1) tab 100 mg, 100 mg, Oral, Daily    multivitamin with minerals (Thera M Plus) tab 1 tablet, 1 tablet, Oral, Daily    folic acid (Folvite) tab 1 mg, 1 mg, Oral, Daily    LORazepam (Ativan) tab 1 mg, 1 mg, Oral, Q1H PRN **OR** LORazepam (Ativan) tab 2 mg, 2 mg, Oral, Q1H PRN    pantoprazole (Protonix) 40 mg in sodium chloride 0.9% PF 10 mL IV push, 40 mg, Intravenous, Q12H     BP 98/66 (BP Location: Right arm)   Pulse 101   Temp 98.6 °F (37 °C) (Oral)   Resp 17   Ht 5' 5\" (1.651 m)   Wt 155 lb (70.3 kg)   LMP 03/07/2011   SpO2 100%   BMI 25.79 kg/m²  Body mass index is 25.79 kg/m².  GENERAL: Well developed, well nourished, in no obvious distress.   CV: RRR  PULM: CTAB  ABDOMEN: Bowel sounds normoactive. Soft, no organomegaly or masses appreciated. Nontender.   EXTREMITIES: Without cyanosis. No peripheral edema appreciated.   NEURO: Alert     Lab Results   Component Value Date     WBC 9.7 07/07/2024     HGB 8.7 07/07/2024     HCT 23.6 07/07/2024     .0 07/07/2024     CREATSERUM 0.73  07/07/2024     BUN 13 07/07/2024      07/07/2024     K 4.0 07/07/2024      07/07/2024     CO2 22.0 07/07/2024     GLU 98 07/07/2024     CA 8.5 07/07/2024     ALB 3.4 07/07/2024     ALKPHO 88 07/07/2024     BILT 0.5 07/07/2024     TP 6.5 07/07/2024     AST 24 07/07/2024     ALT 36 07/07/2024     MG 1.9 07/07/2024       ASSESSMENT AND PLAN:   Anemia  Melena     65 yo F with history of gastric ulcers 2022 and alcohol use disorder here with black stool and coffee ground emesis. On [facility] protocol for withdrawal. Hgb 12 in November 2023. Presented with hgb 10.7->9.8->8.6->8.7. Stabilizing.      - npo for egd today  - pantoprazole 40 mg bid         EGD Operative Report      Pre-op Diagnosis: GI BLEED      Post-Op Diagnosis:                ESOPHAGUS:  non obstructive schatzki's ring with non bleeding tear, la grade B esophagitis               STOMACH:  2 cm hiatal hernia, one 6 mm clean based antral ulcer, biopsied for h pylori               DUODENUM:  normal     Procedure Performed: Procedure(s):  ESOPHAGOGASTRODUODENOSCOPY (EGD) with biopsie    Recommendations: discharge home on regular diet, pantoprazole 40 mg bid ac for two months then daily, egd in two months, ok for baby aspirin     Vitals (last day) before discharge       Date/Time Temp Pulse Resp BP SpO2 Weight O2 Device O2 Flow Rate (L/min) [facility]    07/07/24 1613 98.2 °F (36.8 °C) 88 18 100/62 100 % -- -- -- MV    07/07/24 1357 -- 82 12 97/52 94 % -- -- -- HZ    07/07/24 1355 -- 82 12 97/52 94 % -- -- -- HZ    07/07/24 1350 -- 83 12 93/59 94 % -- -- -- HZ    07/07/24 1345 -- 82 15 96/54 96 % -- -- -- HZ    07/07/24 1340 -- 87 13 91/53 95 % -- -- -- HZ    07/07/24 1335 -- 91 19 92/53 96 % -- -- -- HZ    07/07/24 1330 -- 92 17 97/58 97 % -- -- -- HZ    07/07/24 1325 -- 103 23 80/50 94 % -- -- -- HZ    07/07/24 1320 -- 109 18 78/44 95 % -- -- -- HZ    07/07/24 1319 -- -- -- -- -- -- None (Room air) -- HZ    07/07/24 1319 -- 111 15 81/43 95 % -- -- -- TB     07/07/24 0827 98.6 °F (37 °C) 101 17 98/66 100 % -- None (Room air) --     07/07/24 0600 -- 97 16 90/61 100 % -- None (Room air) -- KS    07/07/24 0421 -- 94 -- 92/57 98 % -- -- -- KS    07/07/24 0408 -- 129 -- 93/77 100 % -- -- --     07/07/24 0407 -- 110 -- 84/65 100 % -- -- --     07/07/24 0400 98.4 °F (36.9 °C) -- 18 -- 99 % -- None (Room air) --     07/07/24 0400 -- 103 -- 80/64 -- -- -- -- EN    07/07/24 0220 -- 108 -- 97/64 95 % -- -- --     07/07/24 0000 98.3 °F (36.8 °C) -- -- -- -- -- -- --     07/07/24 0000 -- 93 18 97/55 96 % -- None (Room air) -- KS    07/06/24 2230 -- 94 -- 101/55 97 % -- -- --     07/06/24 2230 -- -- 18 -- -- -- None (Room air) -- KS    07/06/24 2000 -- 108 -- 120/68 -- -- -- -- EN    07/06/24 2000 -- -- 18 -- 97 % -- None (Room air) -- KS    07/06/24 1804 -- 135 -- -- -- -- -- -- OR    07/06/24 1803 -- 132 -- 107/71 100 % -- -- -- OR    07/06/24 1801 -- 114 -- 121/74 98 % -- -- -- OR    07/06/24 1800 98 °F (36.7 °C) 101 18 105/65 92 % -- None (Room air) 0 L/min OR    07/06/24 1603 98.1 °F (36.7 °C) -- -- -- -- -- -- -- OR    07/06/24 1600 98.1 °F (36.7 °C) 119 18 116/73 100 % -- None (Room air) 0 L/min OR    07/06/24 1215 -- 130 24 120/76 100 % -- None (Room air) -- BS    07/06/24 1145 -- 119 21 125/68 100 % -- None (Room air) --     07/06/24 1130 -- 114 26 150/70 100 % -- None (Room air) --     07/06/24 1127 98.4 °F (36.9 °C) -- -- -- -- -- -- --     07/06/24 1041 -- 136 26 106/65 98 % 155 lb (70.3 kg) None (Room air) --

## 2024-07-08 NOTE — PAYOR COMM NOTE
--------------  DISCHARGE REVIEW    Payor: [facility]/HMO/POS/EPO  Subscriber #:  [id number]  Authorization Number: [id number]    Admit date: 7/6/24  Admit time:   2:22 PM  Discharge Date: 7/7/2024  6:15 PM

## (undated) DEVICE — 450 ML BOTTLE OF 0.05% CHLORHEXIDINE GLUCONATE IN 99.95% STERILE WATER FOR IRRIGATION, USP AND APPLICATOR.: Brand: IRRISEPT ANTIMICROBIAL WOUND LAVAGE

## (undated) DEVICE — 10FT COMBINED O2 DELIVERY/CO2 MONITORING. FILTER WITH MICROSTREAM TYPE LUER: Brand: DUAL ADULT NASAL CANNULA

## (undated) DEVICE — SUT VICRYL 0 CT-1 J470D

## (undated) DEVICE — Device

## (undated) DEVICE — SPONGE PREMIERPRO 7X18X18

## (undated) DEVICE — KIT ELEC MULTIMOD IOM STR LF

## (undated) DEVICE — BIPOLAR SEALER 23-112-1 AQM 6.0: Brand: AQUAMANTYS™

## (undated) DEVICE — HEADREST PRONE FOAM 7IN

## (undated) DEVICE — 3M™ TEGADERM™ TRANSPARENT FILM DRESSING, 1626W, 4 IN X 4-3/4 IN (10 CM X 12 CM), 50 EACH/CARTON, 4 CARTON/CASE: Brand: 3M™ TEGADERM™

## (undated) DEVICE — 3M™ TEGADERM™ TRANSPARENT FILM DRESSING FRAME STYLE 1629: Brand: 3M™ TEGADERM™

## (undated) DEVICE — PTP ACCESS PUSHER CAP: Brand: LIF-PTP

## (undated) DEVICE — SUT SILK 2-0 KS 623H

## (undated) DEVICE — FAN SPRAY KIT: Brand: PULSAVAC®

## (undated) DEVICE — SOL  .9 1000ML BAG

## (undated) DEVICE — FLOSEAL HEMOSTATIC MATRIX, 5ML: Brand: FLOSEAL HEMOSTATIC MATRIX

## (undated) DEVICE — 3M™ IOBAN™ 2 ANTIMICROBIAL INCISE DRAPE 6640EZ: Brand: IOBAN™ 2

## (undated) DEVICE — APPLICATOR CHLORAPREP 26ML

## (undated) DEVICE — 3M™ STERI-STRIP™ REINFORCED ADHESIVE SKIN CLOSURES, R1547, 1/2 IN X 4 IN (12 MM X 100 MM), 6 STRIPS/ENVELOPE: Brand: 3M™ STERI-STRIP™

## (undated) DEVICE — GAMMEX® PI HYBRID SIZE 6.5, STERILE POWDER-FREE SURGICAL GLOVE, POLYISOPRENE AND NEOPRENE BLEND: Brand: GAMMEX

## (undated) DEVICE — X-RAY DETECTABLE SPONGES,16 PLY: Brand: VISTEC

## (undated) DEVICE — ASPIRATION/ANTICOAGULATION SET: Brand: HAEMONETICS CELL SAVER SYSTEM

## (undated) DEVICE — COVER TABLE BACK PADDED HVY DU

## (undated) DEVICE — GAUZE SPONGES,12 PLY: Brand: CURITY

## (undated) DEVICE — 3M™ IOBAN™ 2 ANTIMICROBIAL INCISE DRAPE 6650EZ: Brand: IOBAN™ 2

## (undated) DEVICE — 3.0MM PRECISION NEURO (MATCH HEAD)

## (undated) DEVICE — KIT PATIENT CARE SPINAL TABLE

## (undated) DEVICE — BATTERY

## (undated) DEVICE — LAMINECTOMY ARM CRADLES - COMPRESSED: Brand: SOULE MEDICAL

## (undated) DEVICE — SUT MONOCRYL 3-0 PS-1 Y936H

## (undated) DEVICE — DRAPE SRG 26X15IN UTL TPE STRL

## (undated) DEVICE — WATER STERILE AQUALITE 1000ML

## (undated) DEVICE — GAMMEX® PI HYBRID SIZE 8, STERILE POWDER-FREE SURGICAL GLOVE, POLYISOPRENE AND NEOPRENE BLEND: Brand: GAMMEX

## (undated) DEVICE — CASED DISP BIPOLAR CORD

## (undated) DEVICE — GUIDEWIRE

## (undated) DEVICE — 1200CC GUARDIAN II: Brand: GUARDIAN

## (undated) DEVICE — GAMMEX® NON-LATEX PI ORTHO SIZE 8.5, STERILE POLYISOPRENE POWDER-FREE SURGICAL GLOVE: Brand: GAMMEX

## (undated) DEVICE — GAMMEX® PI HYBRID SIZE 8.5, STERILE POWDER-FREE SURGICAL GLOVE, POLYISOPRENE AND NEOPRENE BLEND: Brand: GAMMEX

## (undated) DEVICE — ELECTRODE VALLEYLAB 10

## (undated) DEVICE — DRAPE SHEET LARGE 76X55

## (undated) DEVICE — Device: Brand: CADWELL PATENTED MULTI-STAGE CLIP

## (undated) DEVICE — E-Z BUTTON SWITCH PENCIL

## (undated) DEVICE — KIT VLV 5 PC AIR H2O SUCT BX ENDOGATOR CONN

## (undated) DEVICE — PTP INTRADISCAL SHIM, WIDE: Brand: LIF-PTP

## (undated) DEVICE — SUT PDS II 1-0 XLH Z881G

## (undated) DEVICE — 3M™ RED DOT™ MONITORING ELECTRODE WITH FOAM TAPE AND STICKY GEL, 50/BAG, 20/CASE, 72/PLT 2570: Brand: RED DOT™

## (undated) DEVICE — KIT CUSTOM ENDOPROCEDURE STERIS

## (undated) DEVICE — PEN: MARKING STD PT 100/CS: Brand: MEDICAL ACTION INDUSTRIES

## (undated) DEVICE — TROCAR TIP, STAINLESS STEEL GUIDEWIRE, 310MM: Brand: IDENTITI

## (undated) DEVICE — CONTAINER GENT-L-KARE 4OZ GRAY

## (undated) DEVICE — CLOSURE EXOFIN 1.0ML

## (undated) DEVICE — GAMMEX® PI HYBRID SIZE 7, STERILE POWDER-FREE SURGICAL GLOVE, POLYISOPRENE AND NEOPRENE BLEND: Brand: GAMMEX

## (undated) DEVICE — MICRO KOVER: Brand: UNBRANDED

## (undated) DEVICE — MEGADYNE E-Z CLEAN BLADE 2.75"

## (undated) DEVICE — POWER T HANDLE, STERILE: Brand: INVICTUS

## (undated) DEVICE — SUT VICRYL 2-0 CT-2 J726D

## (undated) DEVICE — ADHESIVE MASTISOL 2/3ML

## (undated) DEVICE — THE MILL DISPOSABLE - FINE

## (undated) DEVICE — CS5/5+ FASTPACK, 125ML, 150µ RES: Brand: HAEMONETICS CELL SAVER 5/5+ SYSTEMS

## (undated) DEVICE — GIJAW SINGLE-USE BIOPSY FORCEPS WITH NEEDLE: Brand: GIJAW

## (undated) DEVICE — KIT DRN 1/8IN PVC 3 SPRG EVAC

## (undated) DEVICE — TOWEL SURG OR 17X30IN BLUE

## (undated) DEVICE — LAMINECTOMY: Brand: MEDLINE INDUSTRIES, INC.

## (undated) DEVICE — ILLUMINATION SYSTEM

## (undated) DEVICE — SUT VICRYL 2-0 CT-1  JJ42G

## (undated) DEVICE — SUT CHROMIC GUT 3-0 PS-2 1638H

## (undated) DEVICE — SOLUTION  .9 1000ML BTL

## (undated) DEVICE — C-ARMOR C-ARM EQUIPMENT COVERS CLEAR STERILE UNIVERSAL FIT 12 PER CASE: Brand: C-ARMOR

## (undated) DEVICE — PROXIMATE SKIN STAPLERS (35 WIDE) CONTAINS 35 STAINLESS STEEL STAPLES (FIXED HEAD): Brand: PROXIMATE

## (undated) DEVICE — SOLUTION  .9 3000ML

## (undated) DEVICE — BITEBLOCK ENDOSCP 60FR MAXI STRP

## (undated) DEVICE — GAMMEX® PI HYBRID SIZE 7.5, STERILE POWDER-FREE SURGICAL GLOVE, POLYISOPRENE AND NEOPRENE BLEND: Brand: GAMMEX

## (undated) DEVICE — MODULAR POLYAXIAL TULIP
Type: IMPLANTABLE DEVICE | Site: BACK | Status: NON-FUNCTIONAL
Brand: INVICTUS
Removed: 2022-08-19

## (undated) DEVICE — FLEXIBLE YANKAUER,MEDIUM TIP, NO VACUUM CONTROL: Brand: ARGYLE

## (undated) DEVICE — GOWN SURG AERO BLUE PERF XLG

## (undated) DEVICE — WRAP COOLING BACK W/NO PILLOW

## (undated) NOTE — LETTER
[address]  [address]  Authorization for Surgical Operation and Procedure     Date:___________                                                                                                         Time:__________  I hereby authorize Surgeon(s):  Isaac Rachel MD, my physician and his/her assistants (if applicable), which may include medical students, residents, and/or fellows, to perform the following surgical operation/ procedure and administer such anesthesia as may be determined necessary by my physician:  Operation/Procedure name (s) Procedure(s):  ESOPHAGOGASTRODUODENOSCOPY (EGD) on Soila Chaves   2.   I recognize that during the surgical operation/procedure, unforeseen conditions may necessitate additional or different procedures than those listed above.  I, therefore, further authorize and request that the above-named surgeon, assistants, or designees perform such procedures as are, in their judgment, necessary and desirable.    3.   My surgeon/physician has discussed prior to my surgery the potential benefits, risks and side effects of this procedure; the likelihood of achieving goals; and potential problems that might occur during recuperation.  They also discussed reasonable alternatives to the procedure, including risks, benefits, and side effects related to the alternatives and risks related to not receiving this procedure.  I have had all my questions answered and I acknowledge that no guarantee has been made as to the result that may be obtained.    4.   Should the need arise during my operation/procedure, which includes change of level of care prior to discharge, I also consent to the administration of blood and/or blood products.  Further, I understand that despite careful testing and screening of blood or blood products by collecting agencies, I may still be subject to ill effects as a result of receiving a blood transfusion and/or blood products.  The  following are some, but not all, of the potential risks that can occur: fever and allergic reactions, hemolytic reactions, transmission of diseases such as Hepatitis, AIDS and Cytomegalovirus (CMV) and fluid overload.  In the event that I wish to have an autologous transfusion of my own blood, or a directed donor transfusion, I will discuss this with my physician.  Check only if Refusing Blood or Blood Products  I understand refusal of blood or blood products as deemed necessary by my physician may have serious consequences to my condition to include possible death. I hereby assume responsibility for my refusal and release the hospital, its personnel, and my physicians from any responsibility for the consequences of my refusal.          o  Refuse      5.   I authorize the use of any specimen, organs, tissues, body parts or foreign objects that may be removed from my body during the operation/procedure for diagnosis, research or teaching purposes and their subsequent disposal by hospital authorities.  I also authorize the release of specimen test results and/or written reports to my treating physician on the hospital medical staff or other referring or consulting physicians involved in my care, at the discretion of the Pathologist or my treating physician.    6.   I consent to the photographing or videotaping of the operations or procedures to be performed, including appropriate portions of my body for medical, scientific, or educational purposes, provided my identity is not revealed by the pictures or by descriptive texts accompanying them.  If the procedure has been photographed/videotaped, the surgeon will obtain the original picture, image, videotape or CD.  The hospital will not be responsible for storage, release or maintenance of the picture, image, tape or CD.    7.   I consent to the presence of a  or observers in the operating room as deemed necessary by my physician or their designees.     8.   I recognize that in the event my procedure results in extended X-Ray/fluoroscopy time, I may develop a skin reaction.    9. If I have a Do Not Attempt Resuscitation (DNAR) order in place, that status will be suspended while in the operating room, procedural suite, and during the recovery period unless otherwise explicitly stated by me (or a person authorized to consent on my behalf). The surgeon or my attending physician will determine when the applicable recovery period ends for purposes of reinstating the DNAR order.  10. Patients having a sterilization procedure: I understand that if the procedure is successful the results will be permanent and it will therefore be impossible for me to inseminate, conceive, or bear children.  I also understand that the procedure is intended to result in sterility, although the result has not been guaranteed.   11. I acknowledge that my physician has explained sedation/analgesia administration to me including the risk and benefits I consent to the administration of sedation/analgesia as may be necessary or desirable in the judgment of my physician.    I CERTIFY THAT I HAVE READ AND FULLY UNDERSTAND THE ABOVE CONSENT TO OPERATION and/or OTHER PROCEDURE.    _________________________________________  __________________________________  Signature of Patient     Signature of Responsible Person         ___________________________________         Printed Name of Responsible Person           _________________________________                 Relationship to Patient  _________________________________________  ______________________________  Signature of Witness          Date  Time      Patient Name: Soila HERNÁNDEZ Anastacio     : 1960                 Printed: 2024     Medical Record #: [id number]                     Page 1 of [address] [place] [address]  [id number]    Consent for Anesthesia    I, Soila Chaves agree to  be cared for by an anesthesiologist, who is specially trained to monitor me and give me medicine to put me to sleep or keep me comfortable during my procedure    I understand that my anesthesiologist is not an employee or agent of [facility] or [employer]. He or she works for [employer].    As the patient asking for anesthesia services, I agree to:  Allow the anesthesiologist (anesthesia doctor) to give me medicine and do additional procedures as necessary. Some examples are: Starting or using an “IV” to give me medicine, fluids or blood during my procedure, and having a breathing tube placed to help me breathe when I’m asleep (intubation). In the event that my heart stops working properly, I understand that my anesthesiologist will make every effort to sustain my life, unless otherwise directed by [facility] Do Not Resuscitate documents.  Tell my anesthesia doctor before my procedure:  If I am pregnant.  The last time that I ate or drank.  All of the medicines I take (including prescriptions, herbal supplements, and pills I can buy without a prescription (including street drugs/illegal medications). Failure to inform my anesthesiologist about these medicines may increase my risk of anesthetic complications.  If I am allergic to anything or have had a reaction to anesthesia before.  I understand how the anesthesia medicine will help me (benefits).  I understand that with any type of anesthesia medicine there are risks:  The most common risks are: nausea, vomiting, sore throat, muscle soreness, damage to my eyes, mouth, or teeth (from breathing tube placement).  Rare risks include: remembering what happened during my procedure, allergic reactions to medications, injury to my airway, heart, lungs, vision, nerves, or muscles and in extremely rare instances death.  My doctor has explained to me other choices available to me for my care (alternatives).  Pregnant Patients  (“epidural”):  I understand that the risks of having an epidural (medicine given into my back to help control pain during labor), include itching, low blood pressure, difficulty urinating, headache or slowing of the baby’s heart. Very rare risks include infection, bleeding, seizure, irregular heart rhythms and nerve injury.  Regional Anesthesia (“spinal”, “epidural”, & “nerve blocks”):  I understand that rare but potential complications include headache, bleeding, infection, seizure, irregular heart rhythms, and nerve injury.    I can change my mind about having anesthesia services at any time before I get the medicine.    _____________________________________________________________________________  Patient (or Representative) Signature/Relationship to Patient  Date   Time    _____________________________________________________________________________   Name (if used)    Language/Organization   Time    _____________________________________________________________________________  Anesthesiologist Signature     Date   Time  I have discussed the procedure and information above with the patient (or patient’s representative) and answered their questions. The patient or their representative has agreed to have anesthesia services.    _____________________________________________________________________________  Witness        Date   Time  I have verified that the signature is that of the patient or patient’s representative, and that it was signed before the procedure  Patient Name: Soila Chaves     : 1960                 Printed: 2024     Medical Record #: [id number]                     Page 2 of 2